# Patient Record
Sex: MALE | Race: WHITE | NOT HISPANIC OR LATINO | Employment: OTHER | URBAN - METROPOLITAN AREA
[De-identification: names, ages, dates, MRNs, and addresses within clinical notes are randomized per-mention and may not be internally consistent; named-entity substitution may affect disease eponyms.]

---

## 2017-01-12 PROBLEM — Z23 NEED FOR IMMUNIZATION AGAINST INFLUENZA: Status: ACTIVE | Noted: 2017-01-12

## 2017-04-13 ENCOUNTER — OFFICE VISIT (OUTPATIENT)
Dept: INTERNAL MEDICINE | Facility: CLINIC | Age: 42
End: 2017-04-13

## 2017-04-13 VITALS
SYSTOLIC BLOOD PRESSURE: 114 MMHG | OXYGEN SATURATION: 99 % | HEART RATE: 68 BPM | WEIGHT: 233.56 LBS | BODY MASS INDEX: 32.7 KG/M2 | HEIGHT: 71 IN | DIASTOLIC BLOOD PRESSURE: 72 MMHG

## 2017-04-13 DIAGNOSIS — F41.1 GENERALIZED ANXIETY DISORDER: Primary | ICD-10-CM

## 2017-04-13 PROCEDURE — 99213 OFFICE O/P EST LOW 20 MIN: CPT | Performed by: NURSE PRACTITIONER

## 2017-04-13 RX ORDER — CLONAZEPAM 0.5 MG/1
0.5 TABLET ORAL 2 TIMES DAILY PRN
Qty: 40 TABLET | Refills: 0 | Status: SHIPPED | OUTPATIENT
Start: 2017-04-13 | End: 2017-05-11 | Stop reason: SDUPTHER

## 2017-04-13 NOTE — PROGRESS NOTES
Subjective   Colby Nicole is a 42 y.o. male. Patient is here today for   Chief Complaint   Patient presents with   • Anxiety     Pt thought that he has some cardiac issues for the last 3 years & see's Dr. Rivera.  Pt states that he has chest pain, SOA, hand tingling every single day. Pt states he has narrowed it down to anxiety.    .    History of Present Illness   Patient is here to discuss anxiety. He has seen Dr. Ortiz and had a cardiology work up which was negative.  He has palpitations and chest pain on a daily basis. He did use some of his brother's klonopin which helped his symptoms, but made him drowsy. He thinks that he needs to have something on a daily basis. 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     The following portions of the patient's history were reviewed and updated as appropriate: allergies, current medications, past family history, past medical history, past social history, past surgical history and problem list.    Review of Systems   Constitutional: Negative.    Respiratory: Positive for shortness of breath. Negative for cough and wheezing.    Cardiovascular: Positive for chest pain.   Psychiatric/Behavioral: The patient is nervous/anxious.        Objective   Vitals:    04/13/17 1007   BP: 114/72   Pulse: 68   SpO2: 99%     Physical Exam   Constitutional: Vital signs are normal. He appears well-developed and well-nourished.   Cardiovascular: Normal rate, regular rhythm and normal heart sounds.    Pulmonary/Chest: Effort normal and breath sounds normal.   Skin: Skin is warm and dry.   Psychiatric: He has a normal mood and affect. His speech is normal and behavior is normal. Thought content normal.   Nursing note and vitals reviewed.      Assessment/Plan   Colby was seen today for anxiety.    Diagnoses and all orders for this visit:    Generalized anxiety disorder  -     sertraline (ZOLOFT) 50 MG tablet; Take 1 tablet by mouth Daily.  -     clonazePAM (KlonoPIN) 0.5 MG  tablet; Take 1 tablet by mouth 2 (Two) Times a Day As Needed for Anxiety.      As part of this patient's treatment plan I am prescribing controlled substances.  The patient has been made aware of appropriate use of such medications, including potential risk of somnolence. Limited ability to drive and/or work safely, and potential for dependence or overdose.  It has also been made clear that these medications are for use by this patient only, without concomitant use of alcohol or other substances unless prescribed.  [facility] report has been reviewed and scanned into the patient's chart.    Patient will be started on zoloft.  He will be given Klonopin to be used as needed.  The goal is for him to not need any Klonopin.  Patient verbalizes understanding. F/u in one month  Patient declines counseling at this time due to time constraints.

## 2017-05-11 ENCOUNTER — OFFICE VISIT (OUTPATIENT)
Dept: INTERNAL MEDICINE | Facility: CLINIC | Age: 42
End: 2017-05-11

## 2017-05-11 VITALS
OXYGEN SATURATION: 98 % | HEIGHT: 71 IN | HEART RATE: 66 BPM | BODY MASS INDEX: 32.35 KG/M2 | WEIGHT: 231.06 LBS | SYSTOLIC BLOOD PRESSURE: 124 MMHG | DIASTOLIC BLOOD PRESSURE: 70 MMHG

## 2017-05-11 DIAGNOSIS — I10 ESSENTIAL HYPERTENSION: Primary | ICD-10-CM

## 2017-05-11 DIAGNOSIS — F41.1 GENERALIZED ANXIETY DISORDER: ICD-10-CM

## 2017-05-11 DIAGNOSIS — F41.9 ANXIETY: ICD-10-CM

## 2017-05-11 PROCEDURE — 99213 OFFICE O/P EST LOW 20 MIN: CPT | Performed by: NURSE PRACTITIONER

## 2017-05-11 RX ORDER — FLUOXETINE HYDROCHLORIDE 20 MG/1
20 CAPSULE ORAL DAILY
Qty: 30 CAPSULE | Refills: 1 | Status: SHIPPED | OUTPATIENT
Start: 2017-05-11 | End: 2017-07-25

## 2017-05-11 RX ORDER — CLONAZEPAM 0.5 MG/1
0.5 TABLET ORAL 2 TIMES DAILY PRN
Qty: 40 TABLET | Refills: 0 | Status: SHIPPED | OUTPATIENT
Start: 2017-05-11 | End: 2017-07-25 | Stop reason: SDUPTHER

## 2017-07-25 ENCOUNTER — OFFICE VISIT (OUTPATIENT)
Dept: INTERNAL MEDICINE | Facility: CLINIC | Age: 42
End: 2017-07-25

## 2017-07-25 VITALS
WEIGHT: 231.25 LBS | HEIGHT: 71 IN | HEART RATE: 67 BPM | SYSTOLIC BLOOD PRESSURE: 120 MMHG | BODY MASS INDEX: 32.37 KG/M2 | OXYGEN SATURATION: 98 % | DIASTOLIC BLOOD PRESSURE: 80 MMHG

## 2017-07-25 DIAGNOSIS — F41.1 GENERALIZED ANXIETY DISORDER: ICD-10-CM

## 2017-07-25 DIAGNOSIS — F41.9 ANXIETY: Primary | ICD-10-CM

## 2017-07-25 DIAGNOSIS — D22.9 NEVUS: ICD-10-CM

## 2017-07-25 PROCEDURE — 99213 OFFICE O/P EST LOW 20 MIN: CPT | Performed by: NURSE PRACTITIONER

## 2017-07-25 RX ORDER — CLONAZEPAM 0.5 MG/1
0.5 TABLET ORAL 2 TIMES DAILY PRN
Qty: 40 TABLET | Refills: 0 | Status: SHIPPED | OUTPATIENT
Start: 2017-07-25 | End: 2017-09-06 | Stop reason: SDUPTHER

## 2017-07-25 NOTE — PROGRESS NOTES
Subjective   Colby Nicole is a 42 y.o. male. Patient is here today for   Chief Complaint   Patient presents with   • Anxiety     Pt here to follow up on anxiety. Pt stated that he has been off of the prozac for 3 weeks due to SE's.    .    History of Present Illness   Patient is here to follow-up on anxiety.  Patient states that Prozac did not help with his anxiety. He felt numb. He also had bad dreams. He didn't like the way it made him feel.  He has been working out a couple days a week and states that he does feel better after working out. He had tried zoloft which also did not help.  He states that he does not need to take the Klonopin on a daily basis, but that it does help when he needs it.  Patient has a few moles that he would like to have looked at.  With his insurance, he needs to have a referral to a dermatologist.  He can't make the appointment without a referral.    The following portions of the patient's history were reviewed and updated as appropriate: allergies, current medications, past family history, past medical history, past social history, past surgical history and problem list.    Review of Systems   Constitutional: Negative.    Respiratory: Negative.    Cardiovascular: Negative.  Negative for palpitations.   Psychiatric/Behavioral: The patient is nervous/anxious.        Objective   Vitals:    07/25/17 1315   BP: 120/80   Pulse: 67   SpO2: 98%     Physical Exam   Constitutional: Vital signs are normal. He appears well-developed and well-nourished.   Cardiovascular: Normal rate, regular rhythm and normal heart sounds.    Pulmonary/Chest: Effort normal and breath sounds normal.   Skin: Skin is warm, dry and intact.   Psychiatric: He has a normal mood and affect. His speech is normal and behavior is normal. Thought content normal.   Nursing note and vitals reviewed.      Assessment/Plan   Colby was seen today for anxiety.    Diagnoses and all orders for this  visit:    Anxiety    Generalized anxiety disorder  -     clonazePAM (KlonoPIN) 0.5 MG tablet; Take 1 tablet by mouth 2 (Two) Times a Day As Needed for Anxiety.    Nevus  -     Ambulatory Referral to Dermatology      Patient will continue with Klonopin for now.  Follow-up in 3 months.

## 2017-09-06 DIAGNOSIS — F41.1 GENERALIZED ANXIETY DISORDER: ICD-10-CM

## 2017-09-06 NOTE — TELEPHONE ENCOUNTER
Pt called & stated that [facility] pharmacy in [place] had told him that our office faxed back a rf request stating that he was no longer a patient of ours. I had let him know that I had not seen a rf request come through & if there was an issue with us discharging him as a patient, he would be notified by our office first.     Pt is requesting a rf on his clonazepam 0.5mg one tablet by mouth BID PRN anxiety #40/0 rf's.

## 2017-09-07 RX ORDER — CLONAZEPAM 0.5 MG/1
0.5 TABLET ORAL 2 TIMES DAILY PRN
Qty: 40 TABLET | Refills: 0 | OUTPATIENT
Start: 2017-09-07 | End: 2017-10-11 | Stop reason: SDUPTHER

## 2017-10-03 ENCOUNTER — OFFICE VISIT (OUTPATIENT)
Dept: INTERNAL MEDICINE | Facility: CLINIC | Age: 42
End: 2017-10-03

## 2017-10-03 VITALS
TEMPERATURE: 98.2 F | OXYGEN SATURATION: 98 % | HEIGHT: 71 IN | SYSTOLIC BLOOD PRESSURE: 106 MMHG | HEART RATE: 70 BPM | BODY MASS INDEX: 32.37 KG/M2 | WEIGHT: 231.19 LBS | DIASTOLIC BLOOD PRESSURE: 70 MMHG

## 2017-10-03 DIAGNOSIS — J01.00 SUBACUTE MAXILLARY SINUSITIS: Primary | ICD-10-CM

## 2017-10-03 PROCEDURE — 99213 OFFICE O/P EST LOW 20 MIN: CPT | Performed by: NURSE PRACTITIONER

## 2017-10-03 RX ORDER — CEFDINIR 300 MG/1
300 CAPSULE ORAL 2 TIMES DAILY
Qty: 20 CAPSULE | Refills: 0 | Status: SHIPPED | OUTPATIENT
Start: 2017-10-03 | End: 2017-11-03

## 2017-10-03 NOTE — PROGRESS NOTES
Subjective   Colby Nicole is a 42 y.o. male. Patient is here today for   Chief Complaint   Patient presents with   • URI     Pt here to follow up on UC visit on 9/26/2017. Pt was treated for URI & still complains of having productive cough, diarrhea, fatigue & chills/sweats.   .    History of Present Illness   C/o nasal congestion for about 10 days associated with cough, chest congestion, chest aches, chills. He went to urgent care and was given prescriptions of tessalon, prednisone, and stahist.  States that Prednisone helped for a few days. He has had some loose stools, but denies nausea or abdominal pain. He has also tried Mucinex without much relief. States that he has a lot of thick, green drainage. Denies sick contacts.     The following portions of the patient's history were reviewed and updated as appropriate: allergies, current medications, past family history, past medical history, past social history, past surgical history and problem list.    Review of Systems   Constitutional: Positive for chills and fatigue.   HENT: Positive for congestion, postnasal drip and sinus pressure. Negative for ear pain and sore throat.    Respiratory: Positive for cough and shortness of breath. Negative for wheezing.    Cardiovascular: Negative.    Gastrointestinal: Positive for diarrhea. Negative for abdominal pain, nausea and vomiting.       Objective   Vitals:    10/03/17 1258   BP: 106/70   Pulse: 70   Temp: 98.2 °F (36.8 °C)   SpO2: 98%     Physical Exam   Constitutional: Vital signs are normal. He appears well-developed and well-nourished.   HENT:   Right Ear: Ear canal normal. A middle ear effusion is present.   Left Ear: Ear canal normal. A middle ear effusion is present.   Mouth/Throat: Posterior oropharyngeal erythema present.   Cardiovascular: Normal rate, regular rhythm and normal heart sounds.    Pulmonary/Chest: Effort normal and breath sounds normal.   Lymphadenopathy:     He has cervical adenopathy.    Skin: Skin is warm, dry and intact.   Psychiatric: He has a normal mood and affect. His speech is normal and behavior is normal. Thought content normal.   Nursing note and vitals reviewed.      Assessment/Plan   Colby was seen today for uri.    Diagnoses and all orders for this visit:    Subacute maxillary sinusitis  -     cefdinir (OMNICEF) 300 MG capsule; Take 1 capsule by mouth 2 (Two) Times a Day.    Continue with tessalon for cough.

## 2017-10-11 DIAGNOSIS — F41.1 GENERALIZED ANXIETY DISORDER: ICD-10-CM

## 2017-10-12 RX ORDER — CLONAZEPAM 0.5 MG/1
TABLET ORAL
Qty: 40 TABLET | Refills: 0 | OUTPATIENT
Start: 2017-10-12 | End: 2017-11-09 | Stop reason: SDUPTHER

## 2017-10-16 DIAGNOSIS — I10 ESSENTIAL HYPERTENSION: Primary | ICD-10-CM

## 2017-10-16 DIAGNOSIS — E78.00 ELEVATED CHOLESTEROL: ICD-10-CM

## 2017-11-02 LAB
ALBUMIN SERPL-MCNC: 4.7 G/DL (ref 3.5–5.2)
ALBUMIN/GLOB SERPL: 2 G/DL
ALP SERPL-CCNC: 56 U/L (ref 39–117)
ALT SERPL-CCNC: 38 U/L (ref 1–41)
AST SERPL-CCNC: 21 U/L (ref 1–40)
BILIRUB SERPL-MCNC: 0.6 MG/DL (ref 0.1–1.2)
BUN SERPL-MCNC: 14 MG/DL (ref 6–20)
BUN/CREAT SERPL: 10.5 (ref 7–25)
CALCIUM SERPL-MCNC: 9.5 MG/DL (ref 8.6–10.5)
CHLORIDE SERPL-SCNC: 100 MMOL/L (ref 98–107)
CHOLEST SERPL-MCNC: 269 MG/DL (ref 0–200)
CHOLEST/HDLC SERPL: 4.34 {RATIO}
CO2 SERPL-SCNC: 28.7 MMOL/L (ref 22–29)
CREAT SERPL-MCNC: 1.33 MG/DL (ref 0.76–1.27)
GFR SERPLBLD CREATININE-BSD FMLA CKD-EPI: 60 ML/MIN/1.73
GFR SERPLBLD CREATININE-BSD FMLA CKD-EPI: 72 ML/MIN/1.73
GLOBULIN SER CALC-MCNC: 2.4 GM/DL
GLUCOSE SERPL-MCNC: 79 MG/DL (ref 65–99)
HDLC SERPL-MCNC: 62 MG/DL (ref 40–60)
LDLC SERPL CALC-MCNC: 189 MG/DL (ref 0–100)
POTASSIUM SERPL-SCNC: 4.4 MMOL/L (ref 3.5–5.2)
PROT SERPL-MCNC: 7.1 G/DL (ref 6–8.5)
SODIUM SERPL-SCNC: 143 MMOL/L (ref 136–145)
TRIGL SERPL-MCNC: 92 MG/DL (ref 0–150)
VLDLC SERPL CALC-MCNC: 18.4 MG/DL (ref 5–40)

## 2017-11-03 ENCOUNTER — OFFICE VISIT (OUTPATIENT)
Dept: INTERNAL MEDICINE | Facility: CLINIC | Age: 42
End: 2017-11-03

## 2017-11-03 VITALS
SYSTOLIC BLOOD PRESSURE: 144 MMHG | BODY MASS INDEX: 32.82 KG/M2 | HEIGHT: 71 IN | HEART RATE: 89 BPM | WEIGHT: 234.44 LBS | OXYGEN SATURATION: 99 % | DIASTOLIC BLOOD PRESSURE: 82 MMHG

## 2017-11-03 DIAGNOSIS — I10 ESSENTIAL HYPERTENSION: ICD-10-CM

## 2017-11-03 DIAGNOSIS — Z23 NEED FOR INFLUENZA VACCINATION: ICD-10-CM

## 2017-11-03 DIAGNOSIS — E78.5 HYPERLIPIDEMIA, UNSPECIFIED HYPERLIPIDEMIA TYPE: ICD-10-CM

## 2017-11-03 DIAGNOSIS — F41.9 ANXIETY: Primary | ICD-10-CM

## 2017-11-03 PROCEDURE — 99214 OFFICE O/P EST MOD 30 MIN: CPT | Performed by: NURSE PRACTITIONER

## 2017-11-03 PROCEDURE — 90686 IIV4 VACC NO PRSV 0.5 ML IM: CPT | Performed by: NURSE PRACTITIONER

## 2017-11-03 PROCEDURE — 90471 IMMUNIZATION ADMIN: CPT | Performed by: NURSE PRACTITIONER

## 2017-11-03 NOTE — PROGRESS NOTES
Subjective   Colby Nicole is a 42 y.o. male. Patient is here today for   Chief Complaint   Patient presents with   • Anxiety     Pt here to follow up on anxiety.    .    History of Present Illness   Patient here to follow up on anxiety and elevated blood pressure. Blood pressure was 170/110 and he took metoprolol which helped. He also took 2 clonazepam which helped with his chest pain. He is thinking that he wants to start on a daily medication for his anxiety. His close friends and family had seen changes for the better when he was on zoloft even though the patient states that he didn't notice a difference. His girlfriend is here today. 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 He goes to counseling every 2 weeks with his ex-wife, but hasn't gone to any  Individual counseling for himself.     HTN - the last time he saw his cardiologist, he said that the patient can use metoprolol prn.     He is also here to review the lab results he had drawn 2 days ago.    The following portions of the patient's history were reviewed and updated as appropriate: allergies, current medications, past family history, past medical history, past social history, past surgical history and problem list.    Review of Systems   Constitutional: Negative.    Respiratory: Negative.    Cardiovascular: Positive for chest pain.   Psychiatric/Behavioral: Positive for agitation. The patient is nervous/anxious.        Objective   Vitals:    11/03/17 1505   BP: 144/82   Pulse: 89   SpO2: 99%     No visits with results within 2 Week(s) from this visit.  Latest known visit with results is:    Orders Only on 10/16/2017   Component Date Value Ref Range Status   • Glucose 11/01/2017 79  65 - 99 mg/dL Final   • BUN 11/01/2017 14  6 - 20 mg/dL Final   • Creatinine 11/01/2017 1.33* 0.76 - 1.27 mg/dL Final   • eGFR Non African Am 11/01/2017 60* >60 mL/min/1.73 Final   • eGFR African Am  11/01/2017 72  >60 mL/min/1.73 Final   • BUN/Creatinine Ratio 11/01/2017 10.5  7.0 - 25.0 Final   • Sodium 11/01/2017 143  136 - 145 mmol/L Final   • Potassium 11/01/2017 4.4  3.5 - 5.2 mmol/L Final   • Chloride 11/01/2017 100  98 - 107 mmol/L Final   • Total CO2 11/01/2017 28.7  22.0 - 29.0 mmol/L Final   • Calcium 11/01/2017 9.5  8.6 - 10.5 mg/dL Final   • Total Protein 11/01/2017 7.1  6.0 - 8.5 g/dL Final   • Albumin 11/01/2017 4.70  3.50 - 5.20 g/dL Final   • Globulin 11/01/2017 2.4  gm/dL Final   • A/G Ratio 11/01/2017 2.0  g/dL Final   • Total Bilirubin 11/01/2017 0.6  0.1 - 1.2 mg/dL Final   • Alkaline Phosphatase 11/01/2017 56  39 - 117 U/L Final   • AST (SGOT) 11/01/2017 21  1 - 40 U/L Final   • ALT (SGPT) 11/01/2017 38  1 - 41 U/L Final   • Total Cholesterol 11/01/2017 269* 0 - 200 mg/dL Final   • Triglycerides 11/01/2017 92  0 - 150 mg/dL Final   • HDL Cholesterol 11/01/2017 62* 40 - 60 mg/dL Final   • VLDL Cholesterol 11/01/2017 18.4  5 - 40 mg/dL Final   • LDL Cholesterol  11/01/2017 189* 0 - 100 mg/dL Final   • Chol/HDL Ratio 11/01/2017 4.34   Final     Reviewed labs with patient.     Physical Exam   Constitutional: Vital signs are normal. He appears well-developed and well-nourished.   Neck: Carotid bruit is not present.   Cardiovascular: Normal rate, regular rhythm and normal heart sounds.    Pulses:       Carotid pulses are 2+ on the right side, and 2+ on the left side.       Radial pulses are 2+ on the right side, and 2+ on the left side.   Pulmonary/Chest: Effort normal and breath sounds normal.   Skin: Skin is warm, dry and intact.   Psychiatric: He has a normal mood and affect. His speech is normal and behavior is normal. Judgment and thought content normal.   Nursing note and vitals reviewed.      Assessment/Plan   Colby was seen today for anxiety.    Diagnoses and all orders for this visit:    Anxiety  -     sertraline (ZOLOFT) 50 MG tablet; Take 1 tablet by mouth Daily.    Essential  hypertension    Hyperlipidemia, unspecified hyperlipidemia type  -     Comprehensive Metabolic Panel; Future  -     Lipid Panel With / Chol / HDL Ratio; Future    Need for influenza vaccination  -     Flu Vaccine Quad PF 3YR+ (FLUARIX/FLUZONE [id number])    Hyperlipidemia - patient given a handout on low cholesterol diet.    HTN - continue with Metoprolol daily, especially during this time of increased stress    Anxiety - will start zoloft again. F/u in one month. Patient does not need a refill on klonopin at this time.

## 2017-11-09 DIAGNOSIS — F41.1 GENERALIZED ANXIETY DISORDER: ICD-10-CM

## 2017-11-10 RX ORDER — CLONAZEPAM 0.5 MG/1
0.5 TABLET ORAL 2 TIMES DAILY PRN
Qty: 40 TABLET | Refills: 0 | OUTPATIENT
Start: 2017-11-10 | End: 2017-12-21 | Stop reason: SDUPTHER

## 2017-12-07 ENCOUNTER — OFFICE VISIT (OUTPATIENT)
Dept: INTERNAL MEDICINE | Facility: CLINIC | Age: 42
End: 2017-12-07

## 2017-12-07 VITALS
TEMPERATURE: 98.6 F | DIASTOLIC BLOOD PRESSURE: 72 MMHG | HEIGHT: 71 IN | WEIGHT: 236.19 LBS | HEART RATE: 76 BPM | BODY MASS INDEX: 33.06 KG/M2 | SYSTOLIC BLOOD PRESSURE: 112 MMHG | OXYGEN SATURATION: 98 %

## 2017-12-07 DIAGNOSIS — F41.9 ANXIETY: Primary | ICD-10-CM

## 2017-12-07 DIAGNOSIS — J06.9 ACUTE URI: ICD-10-CM

## 2017-12-07 PROCEDURE — 99213 OFFICE O/P EST LOW 20 MIN: CPT | Performed by: NURSE PRACTITIONER

## 2017-12-07 RX ORDER — SERTRALINE HYDROCHLORIDE 100 MG/1
100 TABLET, FILM COATED ORAL DAILY
Qty: 30 TABLET | Refills: 3 | Status: SHIPPED | OUTPATIENT
Start: 2017-12-07 | End: 2018-06-22

## 2017-12-07 NOTE — PROGRESS NOTES
Subjective   Colby Nicole is a 42 y.o. male. Patient is here today for   Chief Complaint   Patient presents with   • Anxiety     Pt here to follow up on anxiety Pt was started on Zoloft 50mg once daily. Pt is requesting an increase on this med.    • URI     Pt complains of having a productive cough, nasal & throat drainage x2 day. Pt has been taking Stahist.    .    History of Present Illness   Patient is here to follow up on anxiety. He is taking clonopin up to 3 times a day, but occasionally doesn't need it. He is also taking zoloft 50 mg daily. States that he can tell a difference in his mood since starting this medication.    C/o post-nasal drainage x 3 days associated with cough. His nasal discharge is green colored in the morning. Stahist is starting to help. Denies fever, sick contact.     The following portions of the patient's history were reviewed and updated as appropriate: allergies, current medications, past family history, past medical history, past social history, past surgical history and problem list.    Review of Systems   Constitutional: Negative.    HENT: Positive for congestion and postnasal drip. Negative for sinus pressure.    Respiratory: Positive for cough. Negative for shortness of breath and wheezing.    Cardiovascular: Negative.    Psychiatric/Behavioral: Negative for agitation (improved). The patient is nervous/anxious.        Objective   Vitals:    12/07/17 1106   BP: 112/72   Pulse: 76   Temp: 98.6 °F (37 °C)   SpO2: 98%     Physical Exam   Constitutional: Vital signs are normal. He appears well-developed and well-nourished.   HENT:   Right Ear: Tympanic membrane and ear canal normal.   Left Ear: Tympanic membrane and ear canal normal.   Nose: Right sinus exhibits no maxillary sinus tenderness and no frontal sinus tenderness. Left sinus exhibits no maxillary sinus tenderness and no frontal sinus tenderness.   Mouth/Throat: Mucous membranes are normal. Posterior oropharyngeal  erythema present.   Cardiovascular: Normal rate, regular rhythm and normal heart sounds.    Pulmonary/Chest: Effort normal and breath sounds normal.   Lymphadenopathy:     He has no cervical adenopathy.   Skin: Skin is warm, dry and intact.   Psychiatric: He has a normal mood and affect. His speech is normal and behavior is normal. Thought content normal.   Nursing note and vitals reviewed.      Assessment/Plan   Colby was seen today for anxiety and uri.    Diagnoses and all orders for this visit:    Anxiety  -     sertraline (ZOLOFT) 100 MG tablet; Take 1 tablet by mouth Daily.    Acute URI    Patient is to continue with Stahist. He can call the office next week for possible antibiotic if his symptoms have not improved or have worsened.     Anxiety - will increase zoloft to 100 mg daily. The goal is for him to not need clonazepam on a regular basis. He has an appointment for fasting labs in 2 months. F/u for anxiety at that time.

## 2017-12-14 ENCOUNTER — TELEPHONE (OUTPATIENT)
Dept: INTERNAL MEDICINE | Facility: CLINIC | Age: 42
End: 2017-12-14

## 2017-12-14 RX ORDER — AMOXICILLIN 875 MG/1
875 TABLET, COATED ORAL 2 TIMES DAILY
Qty: 20 TABLET | Refills: 0 | Status: SHIPPED | OUTPATIENT
Start: 2017-12-14 | End: 2017-12-18

## 2017-12-14 NOTE — TELEPHONE ENCOUNTER
----- Message from VINCENT Dominguez sent at 12/14/2017  8:29 AM EST -----  Okay to call in amoxicillin 875 mg po bid x 10 days    ----- Message -----     From: Yamilex Mccallum     Sent: 12/13/2017  11:37 AM       To: VINCENT Dominguez        ----- Message -----     From: Mariel Arambula MA     Sent: 12/13/2017  11:04 AM       To: Yamilex Mccallum    THERES NOTHING IN THE LAST NOTE TO LET US KNOW WHAT WAS GOING ON WITH THE PT.  ----- Message -----     From: Isabel Barrow     Sent: 12/13/2017  10:58 AM       To: Mariel Arambula MA    Pt said he as told to call back if symptoms did not improve and Rupa would call in an antibiotic.  Phone: [phone]  Pharmacy: [facility]

## 2017-12-18 ENCOUNTER — HOSPITAL ENCOUNTER (OUTPATIENT)
Dept: GENERAL RADIOLOGY | Facility: HOSPITAL | Age: 42
Discharge: HOME | End: 2017-12-18
Admitting: NURSE PRACTITIONER

## 2017-12-18 ENCOUNTER — OFFICE VISIT (OUTPATIENT)
Dept: INTERNAL MEDICINE | Facility: CLINIC | Age: 42
End: 2017-12-18

## 2017-12-18 VITALS
OXYGEN SATURATION: 98 % | HEART RATE: 85 BPM | BODY MASS INDEX: 32.52 KG/M2 | HEIGHT: 71 IN | SYSTOLIC BLOOD PRESSURE: 122 MMHG | DIASTOLIC BLOOD PRESSURE: 80 MMHG | WEIGHT: 232.25 LBS

## 2017-12-18 DIAGNOSIS — R10.12 LUQ ABDOMINAL PAIN: ICD-10-CM

## 2017-12-18 DIAGNOSIS — B02.9 HERPES ZOSTER WITHOUT COMPLICATION: Primary | ICD-10-CM

## 2017-12-18 DIAGNOSIS — R05.9 COUGH: ICD-10-CM

## 2017-12-18 PROCEDURE — 71101 X-RAY EXAM UNILAT RIBS/CHEST: CPT

## 2017-12-18 PROCEDURE — 99213 OFFICE O/P EST LOW 20 MIN: CPT | Performed by: NURSE PRACTITIONER

## 2017-12-18 RX ORDER — VALACYCLOVIR HYDROCHLORIDE 1 G/1
1000 TABLET, FILM COATED ORAL 3 TIMES DAILY
Qty: 21 TABLET | Refills: 0 | Status: SHIPPED | OUTPATIENT
Start: 2017-12-18 | End: 2017-12-28

## 2017-12-18 NOTE — PROGRESS NOTES
Subjective   Colby Nicole is a 42 y.o. male. Patient is here today for   Chief Complaint   Patient presents with   • Rib Pain     Pt complains of having left rib pain x2 days. Pt states that this pain radiates to the left side of his back.    .    History of Present Illness   C/o left side back pain x 2 days associated with pain that radiates around to abdomen, nausea. Denies any problems with his bowels. Denies any injury. He has had some nasal congestion, chest congestion, and coughing x 2 weeks. Denies fever.  He has tried Mylanta, gas-X, and a heating pain without relief. Denies blood in urine, history of kidney stones. Denies increased pain with breathing.     The following portions of the patient's history were reviewed and updated as appropriate: allergies, current medications, past family history, past medical history, past social history, past surgical history and problem list.    Review of Systems   Constitutional: Negative.    HENT: Positive for congestion and postnasal drip.    Respiratory: Positive for cough. Negative for shortness of breath and wheezing.    Cardiovascular: Negative.    Gastrointestinal: Positive for abdominal pain. Negative for constipation, diarrhea and nausea.   Musculoskeletal: Positive for back pain.       Objective   Vitals:    12/18/17 1428   BP: 122/80   Pulse: 85   SpO2: 98%     Physical Exam   Constitutional: Vital signs are normal. He appears well-developed and well-nourished.   Cardiovascular: Normal rate, regular rhythm and normal heart sounds.    Pulmonary/Chest: Effort normal and breath sounds normal.   Abdominal: Soft. Normal appearance and bowel sounds are normal. There is tenderness in the left upper quadrant.   Skin: Skin is warm, dry and intact. Rash noted. Rash is vesicular.        Psychiatric: He has a normal mood and affect. His speech is normal and behavior is normal. Thought content normal.   Nursing note and vitals reviewed.      Assessment/Plan   Colby  was seen today for rib pain.    Diagnoses and all orders for this visit:    Herpes zoster without complication  -     valACYclovir (VALTREX) 1000 MG tablet; Take 1 tablet by mouth 3 (Three) Times a Day.    Cough  -     XR Ribs Left With PA Chest; Future    LUQ abdominal pain  -     Amylase  -     Lipase  -     CBC & Differential  -     traMADol (ULTRAM) 50 MG tablet; Take 1 tablet by mouth Every 8 (Eight) Hours As Needed for Moderate Pain .    Patient may have early signs of shingles. Denies any history of having chicken pox. F/u based on labs and x-ray. He could also have some pleurisy from coughing.

## 2017-12-19 LAB
AMYLASE SERPL-CCNC: 98 U/L (ref 28–100)
BASOPHILS # BLD AUTO: 0.03 10*3/MM3 (ref 0–0.2)
BASOPHILS NFR BLD AUTO: 0.4 % (ref 0–1.5)
EOSINOPHIL # BLD AUTO: 0.1 10*3/MM3 (ref 0–0.7)
EOSINOPHIL NFR BLD AUTO: 1.2 % (ref 0.3–6.2)
ERYTHROCYTE [DISTWIDTH] IN BLOOD BY AUTOMATED COUNT: 12.3 % (ref 11.5–14.5)
HCT VFR BLD AUTO: 52.1 % (ref 40.4–52.2)
HGB BLD-MCNC: 17.5 G/DL (ref 13.7–17.6)
IMM GRANULOCYTES # BLD: 0.02 10*3/MM3 (ref 0–0.03)
IMM GRANULOCYTES NFR BLD: 0.2 % (ref 0–0.5)
LIPASE SERPL-CCNC: 38 U/L (ref 13–60)
LYMPHOCYTES # BLD AUTO: 3.17 10*3/MM3 (ref 0.9–4.8)
LYMPHOCYTES NFR BLD AUTO: 37.7 % (ref 19.6–45.3)
MCH RBC QN AUTO: 30.5 PG (ref 27–32.7)
MCHC RBC AUTO-ENTMCNC: 33.6 G/DL (ref 32.6–36.4)
MCV RBC AUTO: 90.8 FL (ref 79.8–96.2)
MONOCYTES # BLD AUTO: 0.69 10*3/MM3 (ref 0.2–1.2)
MONOCYTES NFR BLD AUTO: 8.2 % (ref 5–12)
NEUTROPHILS # BLD AUTO: 4.4 10*3/MM3 (ref 1.9–8.1)
NEUTROPHILS NFR BLD AUTO: 52.3 % (ref 42.7–76)
PLATELET # BLD AUTO: 224 10*3/MM3 (ref 140–500)
RBC # BLD AUTO: 5.74 10*6/MM3 (ref 4.6–6)
WBC # BLD AUTO: 8.41 10*3/MM3 (ref 4.5–10.7)

## 2017-12-19 RX ORDER — TRAMADOL HYDROCHLORIDE 50 MG/1
50 TABLET ORAL EVERY 8 HOURS PRN
Qty: 30 TABLET | Refills: 0 | Status: SHIPPED | OUTPATIENT
Start: 2017-12-19 | End: 2017-12-28

## 2017-12-20 ENCOUNTER — TELEPHONE (OUTPATIENT)
Dept: INTERNAL MEDICINE | Facility: CLINIC | Age: 42
End: 2017-12-20

## 2017-12-20 NOTE — TELEPHONE ENCOUNTER
----- Message from VINCENT Pardo sent at 12/20/2017 11:17 AM EST -----  Regarding: RE: AVELINO  Contact: [phone]  He needs to go to the ER, shingles does not cause nausea and vomiting. Im concerned something else may be going on.   ----- Message -----     From: Shaun Adair     Sent: 12/20/2017  11:13 AM       To: VINCENT Pardo  Subject: FW: SHINMAXIMUS                                      Please advise.  ----- Message -----     From: Yamilex Mccallum     Sent: 12/20/2017  11:00 AM       To: Shaun Adair  Subject: SHINGLES                                         Patients wife called he was seen this week he is now broke out with shingles tramadol is not working and now he has started vomiting he is in a lot of pain. Wife wants to know if we can call something stronger in and if we can give him something for vomiting. He is not able to keep anything down at all.

## 2017-12-20 NOTE — TELEPHONE ENCOUNTER
Patient's wife advised that patient should go to ER for further evaluation per kathie.    Patient's wife was not satisfied with the response, but verbalizes understanding of Kathie's instructions.

## 2017-12-21 DIAGNOSIS — F41.1 GENERALIZED ANXIETY DISORDER: ICD-10-CM

## 2017-12-21 RX ORDER — CLONAZEPAM 0.5 MG/1
0.5 TABLET ORAL 2 TIMES DAILY PRN
Qty: 40 TABLET | Refills: 0 | OUTPATIENT
Start: 2017-12-21 | End: 2018-01-19 | Stop reason: SDUPTHER

## 2017-12-26 ENCOUNTER — TELEPHONE (OUTPATIENT)
Dept: INTERNAL MEDICINE | Facility: CLINIC | Age: 42
End: 2017-12-26

## 2017-12-26 NOTE — TELEPHONE ENCOUNTER
"----- Message from Isabel Barrow sent at 12/26/2017 11:29 AM EST -----  Pt scheduled an appt this morning with Aarti at 11:00am for f/u to ER and Shingles. Patient \"no showed\". At 11:27, patients wife called and wanted to know if one of our providers would write a refill for the pain medicine that the ER prescribed for him  "

## 2017-12-28 ENCOUNTER — OFFICE VISIT (OUTPATIENT)
Dept: INTERNAL MEDICINE | Facility: CLINIC | Age: 42
End: 2017-12-28

## 2017-12-28 VITALS
WEIGHT: 234 LBS | HEIGHT: 71 IN | HEART RATE: 83 BPM | OXYGEN SATURATION: 98 % | SYSTOLIC BLOOD PRESSURE: 120 MMHG | BODY MASS INDEX: 32.76 KG/M2 | DIASTOLIC BLOOD PRESSURE: 78 MMHG

## 2017-12-28 DIAGNOSIS — B02.29 OTHER POSTHERPETIC NERVOUS SYSTEM INVOLVEMENT: Primary | ICD-10-CM

## 2017-12-28 PROBLEM — B02.9 HERPES ZOSTER: Status: ACTIVE | Noted: 2017-12-28

## 2017-12-28 PROCEDURE — 99213 OFFICE O/P EST LOW 20 MIN: CPT | Performed by: NURSE PRACTITIONER

## 2017-12-28 RX ORDER — HYDROCODONE BITARTRATE AND ACETAMINOPHEN 7.5; 325 MG/1; MG/1
1 TABLET ORAL
COMMUNITY
Start: 2017-12-20 | End: 2017-12-28

## 2017-12-28 RX ORDER — LIDOCAINE 50 MG/G
1 PATCH TOPICAL
COMMUNITY
Start: 2017-12-20 | End: 2018-01-05

## 2017-12-28 RX ORDER — PROMETHAZINE HYDROCHLORIDE 25 MG/1
25 TABLET ORAL
COMMUNITY
Start: 2017-12-20 | End: 2018-01-12

## 2017-12-28 NOTE — PROGRESS NOTES
Subjective   Colby Nicole is a 42 y.o. male here for a follow up on shingles and pain.    History of Present Illness   The patient is here today with c/o pain related to recent shingles diagnoses.   Pt originally seen 12/18th with concern for early shingles, given valtrex and tramadol. #30. He was then seen in the ER 12/20th and dx with herpes zoster. He was also having vomiting and difficulty urinating. UA neg, CMP and CBC WNL. Flu negative. Pt given hydrocodone 7.5 #16.  Pain is worse at night. He actually woke him up out of the sleep.  He did finish the valtrex. He states the pain is getting some better.   The following portions of the patient's history were reviewed and updated as appropriate: allergies, current medications, past family history, past medical history, past social history, past surgical history and problem list.    Review of Systems   Constitutional: Positive for chills. Negative for fever.   Respiratory: Negative.    Cardiovascular: Negative.    Musculoskeletal: Positive for back pain.       Objective   Physical Exam   Constitutional: He appears well-developed and well-nourished.   Neck: Normal range of motion. Neck supple. No thyromegaly present.   Cardiovascular: Normal rate, regular rhythm, normal heart sounds and intact distal pulses.    Pulmonary/Chest: Effort normal and breath sounds normal.   Skin: Skin is warm and dry.   Papular erythematous rash present left flank.    Psychiatric: He has a normal mood and affect. His behavior is normal. Judgment and thought content normal.     Vitals:    12/28/17 0947   BP: 120/78   Pulse: 83   SpO2: 98%       Current Outpatient Prescriptions:   •  clonazePAM (KlonoPIN) 0.5 MG tablet, Take 1 tablet by mouth 2 (Two) Times a Day As Needed for Anxiety or Seizures., Disp: 40 tablet, Rfl: 0  •  fluticasone (FLONASE) 50 MCG/ACT nasal spray, 2 sprays into each nostril Daily., Disp: , Rfl:   •  HYDROcodone-acetaminophen (NORCO) 7.5-325 MG per tablet, Take 1  tablet by mouth., Disp: , Rfl:   •  lidocaine (LIDODERM) 5 %, Place 1 patch on the skin., Disp: , Rfl:   •  Omega-3 Fatty Acids (FISH OIL) 1000 MG capsule capsule, Take 1,000 mg by mouth Daily With Breakfast., Disp: , Rfl:   •  omeprazole (priLOSEC) 20 MG capsule, Take 20 mg by mouth Daily., Disp: , Rfl:   •  promethazine (PHENERGAN) 25 MG tablet, Take 25 mg by mouth., Disp: , Rfl:   •  sertraline (ZOLOFT) 100 MG tablet, Take 1 tablet by mouth Daily., Disp: 30 tablet, Rfl: 3  •  traMADol (ULTRAM) 50 MG tablet, Take 1 tablet by mouth Every 8 (Eight) Hours As Needed for Moderate Pain ., Disp: 30 tablet, Rfl: 0  •  NITROSTAT 0.4 MG SL tablet, Take 1 tablet by mouth As Needed., Disp: , Rfl:   •  valACYclovir (VALTREX) 1000 MG tablet, Take 1 tablet by mouth 3 (Three) Times a Day., Disp: 21 tablet, Rfl: 0    Assessment/Plan   There are no diagnoses linked to this encounter.    1. Herpes Zoster- try horizant with josy.  Did discuss side effects and appropriate use.  Wagner up-to-date and CSA signed today for this.  He was given a coupon card for this and hopefully will be able to get 30 days free.  I did discuss that if he needed to this be could taken twice daily but would need to call us so we could change the prescription.

## 2018-01-05 ENCOUNTER — OFFICE VISIT (OUTPATIENT)
Dept: INTERNAL MEDICINE | Facility: CLINIC | Age: 43
End: 2018-01-05

## 2018-01-05 VITALS
SYSTOLIC BLOOD PRESSURE: 110 MMHG | HEIGHT: 71 IN | BODY MASS INDEX: 33.25 KG/M2 | DIASTOLIC BLOOD PRESSURE: 68 MMHG | WEIGHT: 237.5 LBS | OXYGEN SATURATION: 98 % | HEART RATE: 77 BPM | TEMPERATURE: 98.1 F

## 2018-01-05 DIAGNOSIS — H65.112 ACUTE MUCOID OTITIS MEDIA OF LEFT EAR: Primary | ICD-10-CM

## 2018-01-05 DIAGNOSIS — B02.9 HERPES ZOSTER WITHOUT COMPLICATION: ICD-10-CM

## 2018-01-05 PROCEDURE — 99213 OFFICE O/P EST LOW 20 MIN: CPT | Performed by: NURSE PRACTITIONER

## 2018-01-05 RX ORDER — AMOXICILLIN AND CLAVULANATE POTASSIUM 875; 125 MG/1; MG/1
1 TABLET, FILM COATED ORAL EVERY 12 HOURS SCHEDULED
Qty: 20 TABLET | Refills: 0 | Status: SHIPPED | OUTPATIENT
Start: 2018-01-05 | End: 2018-01-12 | Stop reason: ALTCHOICE

## 2018-01-05 NOTE — PROGRESS NOTES
Subjective   Colby Nicole is a 42 y.o. male. Patient is here today for   Chief Complaint   Patient presents with   • URI     Pt complains of having productive cough & throat drainage x2 days. Pt has been taking OTC meds.    .    History of Present Illness   C/o sore throat x 2 days associated with post-nasal drainage, ear pain, non-productive cough.   Took Rachael [place], Benadryl and Nyquil with minimal relief. He did buy some Stahist today. He has also taken vit C.    Patient is here to follow up on shingles. He is taking ibuprofen 800 mg up to 3 times per day. The pain is slowly getting better. The rash has dried up. States that Horizant made him feel drowsy and lidocaine patches didn't help.     The following portions of the patient's history were reviewed and updated as appropriate: allergies, current medications, past family history, past medical history, past social history, past surgical history and problem list.    Review of Systems   Constitutional: Positive for fatigue.   HENT: Positive for congestion, ear pain, postnasal drip and sore throat.    Respiratory: Positive for cough. Negative for shortness of breath and wheezing.    Cardiovascular: Negative.    Skin: Positive for rash.       Objective   Vitals:    01/05/18 1454   BP: 110/68   Pulse: 77   Temp: 98.1 °F (36.7 °C)   SpO2: 98%     Physical Exam   Constitutional: Vital signs are normal. He appears well-developed and well-nourished.   HENT:   Right Ear: Ear canal normal. A middle ear effusion is present.   Left Ear: Ear canal normal. Tympanic membrane is erythematous. A middle ear effusion is present.   Mouth/Throat: Oropharynx is clear and moist and mucous membranes are normal.   Cardiovascular: Normal rate, regular rhythm and normal heart sounds.    Pulmonary/Chest: Effort normal and breath sounds normal.   Lymphadenopathy:     He has no cervical adenopathy.   Skin: Skin is warm, dry and intact. Rash (healing; dried up) noted.         Psychiatric: He has a normal mood and affect. His speech is normal and behavior is normal. Thought content normal.   Nursing note and vitals reviewed.      Assessment/Plan   Colby was seen today for uri.    Diagnoses and all orders for this visit:    Acute mucoid otitis media of left ear  -     amoxicillin-clavulanate (AUGMENTIN) 875-125 MG per tablet; Take 1 tablet by mouth Every 12 (Twelve) Hours.    Herpes zoster without complication    No further treatment needed for herpes zoster.

## 2018-01-12 ENCOUNTER — OFFICE VISIT (OUTPATIENT)
Dept: INTERNAL MEDICINE | Facility: CLINIC | Age: 43
End: 2018-01-12

## 2018-01-12 VITALS
DIASTOLIC BLOOD PRESSURE: 80 MMHG | SYSTOLIC BLOOD PRESSURE: 122 MMHG | HEIGHT: 71 IN | TEMPERATURE: 99 F | BODY MASS INDEX: 33.25 KG/M2 | WEIGHT: 237.5 LBS | OXYGEN SATURATION: 98 % | HEART RATE: 92 BPM

## 2018-01-12 DIAGNOSIS — R50.9 FEVER, UNSPECIFIED FEVER CAUSE: ICD-10-CM

## 2018-01-12 DIAGNOSIS — J01.10 SUBACUTE FRONTAL SINUSITIS: Primary | ICD-10-CM

## 2018-01-12 LAB
EXPIRATION DATE: NORMAL
FLUAV AG NPH QL: NEGATIVE
FLUBV AG NPH QL: NEGATIVE
INTERNAL CONTROL: NORMAL
Lab: NORMAL

## 2018-01-12 PROCEDURE — 87804 INFLUENZA ASSAY W/OPTIC: CPT | Performed by: NURSE PRACTITIONER

## 2018-01-12 PROCEDURE — 99213 OFFICE O/P EST LOW 20 MIN: CPT | Performed by: NURSE PRACTITIONER

## 2018-01-12 RX ORDER — TRAZODONE HYDROCHLORIDE 150 MG/1
1 TABLET ORAL NIGHTLY PRN
COMMUNITY
Start: 2017-12-20 | End: 2018-06-22

## 2018-01-12 RX ORDER — LEVOFLOXACIN 500 MG/1
500 TABLET, FILM COATED ORAL DAILY
Qty: 10 TABLET | Refills: 0 | Status: SHIPPED | OUTPATIENT
Start: 2018-01-12 | End: 2018-02-27

## 2018-01-12 NOTE — PROGRESS NOTES
Subjective   Colby Nicole is a 42 y.o. male. Patient is here today for   Chief Complaint   Patient presents with   • URI     Pt complains of having a productive cough, nasal congestion, right ear pain & temp of 103 with chills x2 days.      .    History of Present Illness   C/o nasal congestion 2 days ago associated with right ear pain, fever, cough. His fever was as high as 100.3F. He took ibuprofen which helped. He is currently on Augmentin for left ear infection which is feeling better. Denies sick contacts. He has been using Stahist. He has been treated with a couple different antibiotics over the past couple months for sinus and ear issues. Denies allergies.     The following portions of the patient's history were reviewed and updated as appropriate: allergies, current medications, past family history, past medical history, past social history, past surgical history and problem list.    Review of Systems   Constitutional: Positive for fever.   HENT: Positive for congestion, ear pain, postnasal drip, sinus pressure and sneezing. Negative for sore throat.    Respiratory: Positive for cough. Negative for shortness of breath and wheezing.    Cardiovascular: Negative.        Objective   Vitals:    01/12/18 1001   BP: 122/80   Pulse: 92   Temp: 99 °F (37.2 °C)   SpO2: 98%     Physical Exam   Constitutional: Vital signs are normal. He appears well-developed and well-nourished.   HENT:   Right Ear: A middle ear effusion is present.   Left Ear: A middle ear effusion is present.   Nose: Right sinus exhibits frontal sinus tenderness. Left sinus exhibits frontal sinus tenderness.   Mouth/Throat: Oropharynx is clear and moist and mucous membranes are normal.   Cardiovascular: Normal rate, regular rhythm and normal heart sounds.    Pulmonary/Chest: Effort normal and breath sounds normal.   Lymphadenopathy:     He has no cervical adenopathy.   Skin: Skin is warm, dry and intact.   Psychiatric: He has a normal mood and  affect. His speech is normal and behavior is normal. Thought content normal.   Nursing note and vitals reviewed.    Results for orders placed or performed in visit on 01/12/18   POC Influenza A / B   Result Value Ref Range    Rapid Influenza A Ag negative     Rapid Influenza B Ag negative     Internal Control Passed Passed    Lot Number [id number]     Expiration Date 06/01/2019        Assessment/Plan   Colby was seen today for uri.    Diagnoses and all orders for this visit:    Subacute frontal sinusitis  -     levoFLOXacin (LEVAQUIN) 500 MG tablet; Take 1 tablet by mouth Daily.    Fever, unspecified fever cause  -     POC Influenza A / B    If patient's symptoms do not improve, he may need a CT scan of his sinuses and/or a referral to ENT.

## 2018-01-15 ENCOUNTER — TELEPHONE (OUTPATIENT)
Dept: INTERNAL MEDICINE | Facility: CLINIC | Age: 43
End: 2018-01-15

## 2018-01-15 DIAGNOSIS — J01.91 ACUTE RECURRENT SINUSITIS, UNSPECIFIED LOCATION: Primary | ICD-10-CM

## 2018-01-15 NOTE — TELEPHONE ENCOUNTER
----- Message from VINCENT Dominguez sent at 1/15/2018 12:45 PM EST -----  Regarding: RE: mri  Contact: [phone]  Actually, a CT of his sinuses.   I will place the order.  ----- Message -----     From: Kamille Pierre MA     Sent: 1/15/2018  12:04 PM       To: IVNCENT Dominguez  Subject: FW: mri                                          Please see below note & advise.     ----- Message -----     From: Yamilex Mccallum     Sent: 1/15/2018  11:34 AM       To: Kamille Pierre MA  Subject: mri                                              Patient called and said he is not any better Rupa told him to call back and she would order a MRI>

## 2018-01-19 ENCOUNTER — HOSPITAL ENCOUNTER (OUTPATIENT)
Dept: CT IMAGING | Facility: HOSPITAL | Age: 43
Discharge: HOME | End: 2018-01-19
Admitting: NURSE PRACTITIONER

## 2018-01-19 DIAGNOSIS — F41.1 GENERALIZED ANXIETY DISORDER: ICD-10-CM

## 2018-01-19 DIAGNOSIS — J01.91 ACUTE RECURRENT SINUSITIS, UNSPECIFIED LOCATION: ICD-10-CM

## 2018-01-19 PROCEDURE — 70486 CT MAXILLOFACIAL W/O DYE: CPT

## 2018-01-19 RX ORDER — CLONAZEPAM 0.5 MG/1
0.5 TABLET ORAL 2 TIMES DAILY PRN
Qty: 40 TABLET | Refills: 0 | OUTPATIENT
Start: 2018-01-19 | End: 2018-03-07 | Stop reason: SDUPTHER

## 2018-01-23 DIAGNOSIS — J34.2 DEVIATED SEPTUM: Primary | ICD-10-CM

## 2018-02-02 ENCOUNTER — RESULTS ENCOUNTER (OUTPATIENT)
Dept: INTERNAL MEDICINE | Facility: CLINIC | Age: 43
End: 2018-02-02

## 2018-02-02 DIAGNOSIS — E78.5 HYPERLIPIDEMIA, UNSPECIFIED HYPERLIPIDEMIA TYPE: ICD-10-CM

## 2018-02-08 LAB
ALBUMIN SERPL-MCNC: 4.8 G/DL (ref 3.5–5.2)
ALBUMIN/GLOB SERPL: 2.2 G/DL
ALP SERPL-CCNC: 54 U/L (ref 39–117)
ALT SERPL-CCNC: 65 U/L (ref 1–41)
AST SERPL-CCNC: 28 U/L (ref 1–40)
BILIRUB SERPL-MCNC: 0.8 MG/DL (ref 0.1–1.2)
BUN SERPL-MCNC: 13 MG/DL (ref 6–20)
BUN/CREAT SERPL: 11.9 (ref 7–25)
CALCIUM SERPL-MCNC: 9.6 MG/DL (ref 8.6–10.5)
CHLORIDE SERPL-SCNC: 100 MMOL/L (ref 98–107)
CHOLEST SERPL-MCNC: 241 MG/DL (ref 0–200)
CHOLEST/HDLC SERPL: 4.38 {RATIO}
CO2 SERPL-SCNC: 27.9 MMOL/L (ref 22–29)
CREAT SERPL-MCNC: 1.09 MG/DL (ref 0.76–1.27)
GFR SERPLBLD CREATININE-BSD FMLA CKD-EPI: 75 ML/MIN/1.73
GFR SERPLBLD CREATININE-BSD FMLA CKD-EPI: 91 ML/MIN/1.73
GLOBULIN SER CALC-MCNC: 2.2 GM/DL
GLUCOSE SERPL-MCNC: 78 MG/DL (ref 65–99)
HDLC SERPL-MCNC: 55 MG/DL (ref 40–60)
LDLC SERPL CALC-MCNC: 164 MG/DL (ref 0–100)
POTASSIUM SERPL-SCNC: 4.2 MMOL/L (ref 3.5–5.2)
PROT SERPL-MCNC: 7 G/DL (ref 6–8.5)
SODIUM SERPL-SCNC: 141 MMOL/L (ref 136–145)
TRIGL SERPL-MCNC: 108 MG/DL (ref 0–150)
VLDLC SERPL CALC-MCNC: 21.6 MG/DL (ref 5–40)

## 2018-02-27 ENCOUNTER — OFFICE VISIT (OUTPATIENT)
Dept: INTERNAL MEDICINE | Facility: CLINIC | Age: 43
End: 2018-02-27

## 2018-02-27 VITALS
BODY MASS INDEX: 33.11 KG/M2 | OXYGEN SATURATION: 98 % | DIASTOLIC BLOOD PRESSURE: 82 MMHG | HEART RATE: 70 BPM | HEIGHT: 71 IN | SYSTOLIC BLOOD PRESSURE: 128 MMHG | WEIGHT: 236.5 LBS

## 2018-02-27 DIAGNOSIS — E78.5 HYPERLIPIDEMIA, UNSPECIFIED HYPERLIPIDEMIA TYPE: Primary | ICD-10-CM

## 2018-02-27 DIAGNOSIS — B02.9 HERPES ZOSTER WITHOUT COMPLICATION: ICD-10-CM

## 2018-02-27 PROCEDURE — 99213 OFFICE O/P EST LOW 20 MIN: CPT | Performed by: NURSE PRACTITIONER

## 2018-03-07 DIAGNOSIS — F41.1 GENERALIZED ANXIETY DISORDER: ICD-10-CM

## 2018-03-08 RX ORDER — CLONAZEPAM 0.5 MG/1
TABLET ORAL
Qty: 40 TABLET | Refills: 0 | Status: SHIPPED | OUTPATIENT
Start: 2018-03-08 | End: 2018-04-09 | Stop reason: SDUPTHER

## 2018-04-09 DIAGNOSIS — F41.1 GENERALIZED ANXIETY DISORDER: ICD-10-CM

## 2018-04-09 RX ORDER — CLONAZEPAM 0.5 MG/1
TABLET ORAL
Qty: 40 TABLET | Refills: 0 | Status: SHIPPED | OUTPATIENT
Start: 2018-04-09 | End: 2018-05-11 | Stop reason: SDUPTHER

## 2018-05-11 DIAGNOSIS — F41.1 GENERALIZED ANXIETY DISORDER: ICD-10-CM

## 2018-05-11 RX ORDER — CLONAZEPAM 0.5 MG/1
TABLET ORAL
Qty: 40 TABLET | Refills: 0 | Status: SHIPPED | OUTPATIENT
Start: 2018-05-11 | End: 2018-06-21 | Stop reason: SDUPTHER

## 2018-05-16 ENCOUNTER — RESULTS ENCOUNTER (OUTPATIENT)
Dept: INTERNAL MEDICINE | Facility: CLINIC | Age: 43
End: 2018-05-16

## 2018-05-16 DIAGNOSIS — E78.5 HYPERLIPIDEMIA, UNSPECIFIED HYPERLIPIDEMIA TYPE: ICD-10-CM

## 2018-06-18 DIAGNOSIS — F41.1 GENERALIZED ANXIETY DISORDER: ICD-10-CM

## 2018-06-18 RX ORDER — CLONAZEPAM 0.5 MG/1
TABLET ORAL
Qty: 40 TABLET | Refills: 0 | OUTPATIENT
Start: 2018-06-18

## 2018-06-20 LAB
ALBUMIN SERPL-MCNC: 4.5 G/DL (ref 3.5–5.2)
ALBUMIN/GLOB SERPL: 2 G/DL
ALP SERPL-CCNC: 54 U/L (ref 39–117)
ALT SERPL-CCNC: 27 U/L (ref 1–41)
AST SERPL-CCNC: 18 U/L (ref 1–40)
BILIRUB SERPL-MCNC: 0.6 MG/DL (ref 0.1–1.2)
BUN SERPL-MCNC: 14 MG/DL (ref 6–20)
BUN/CREAT SERPL: 13.2 (ref 7–25)
CALCIUM SERPL-MCNC: 9.6 MG/DL (ref 8.6–10.5)
CHLORIDE SERPL-SCNC: 101 MMOL/L (ref 98–107)
CHOLEST SERPL-MCNC: 233 MG/DL (ref 0–200)
CHOLEST/HDLC SERPL: 4.57 {RATIO}
CO2 SERPL-SCNC: 25.6 MMOL/L (ref 22–29)
CREAT SERPL-MCNC: 1.06 MG/DL (ref 0.76–1.27)
GFR SERPLBLD CREATININE-BSD FMLA CKD-EPI: 77 ML/MIN/1.73
GFR SERPLBLD CREATININE-BSD FMLA CKD-EPI: 93 ML/MIN/1.73
GLOBULIN SER CALC-MCNC: 2.2 GM/DL
GLUCOSE SERPL-MCNC: 84 MG/DL (ref 65–99)
HDLC SERPL-MCNC: 51 MG/DL (ref 40–60)
LDLC SERPL CALC-MCNC: 158 MG/DL (ref 0–100)
POTASSIUM SERPL-SCNC: 4.4 MMOL/L (ref 3.5–5.2)
PROT SERPL-MCNC: 6.7 G/DL (ref 6–8.5)
SODIUM SERPL-SCNC: 142 MMOL/L (ref 136–145)
TRIGL SERPL-MCNC: 118 MG/DL (ref 0–150)
VLDLC SERPL CALC-MCNC: 23.6 MG/DL (ref 5–40)

## 2018-06-21 DIAGNOSIS — F41.1 GENERALIZED ANXIETY DISORDER: ICD-10-CM

## 2018-06-21 RX ORDER — CLONAZEPAM 0.5 MG/1
TABLET ORAL
Qty: 40 TABLET | Refills: 0 | Status: SHIPPED | OUTPATIENT
Start: 2018-06-21 | End: 2018-07-20 | Stop reason: SDUPTHER

## 2018-06-22 ENCOUNTER — OFFICE VISIT (OUTPATIENT)
Dept: INTERNAL MEDICINE | Facility: CLINIC | Age: 43
End: 2018-06-22

## 2018-06-22 VITALS
WEIGHT: 237.31 LBS | DIASTOLIC BLOOD PRESSURE: 68 MMHG | SYSTOLIC BLOOD PRESSURE: 112 MMHG | HEART RATE: 71 BPM | HEIGHT: 71 IN | OXYGEN SATURATION: 98 % | BODY MASS INDEX: 33.22 KG/M2

## 2018-06-22 DIAGNOSIS — F41.9 ANXIETY: Primary | ICD-10-CM

## 2018-06-22 DIAGNOSIS — E78.5 HYPERLIPIDEMIA, UNSPECIFIED HYPERLIPIDEMIA TYPE: ICD-10-CM

## 2018-06-22 PROCEDURE — 99213 OFFICE O/P EST LOW 20 MIN: CPT | Performed by: NURSE PRACTITIONER

## 2018-06-22 RX ORDER — AZELASTINE HYDROCHLORIDE AND FLUTICASONE PROPIONATE 137; 50 UG/1; UG/1
1 SPRAY, METERED NASAL DAILY
COMMUNITY
Start: 2018-05-16 | End: 2019-08-16 | Stop reason: SDUPTHER

## 2018-06-22 RX ORDER — SERTRALINE HYDROCHLORIDE 100 MG/1
100 TABLET, FILM COATED ORAL DAILY
Qty: 30 TABLET | Refills: 3 | Status: SHIPPED | OUTPATIENT
Start: 2018-06-22 | End: 2018-09-14

## 2018-06-22 NOTE — PROGRESS NOTES
Subjective   Colby Nicole is a 43 y.o. male. Patient is here today for   Chief Complaint   Patient presents with   • Anxiety     Pt here to follow up on anxiety. Pt is wanting to discuss restarting zoloft.    .    History of Present Illness   Patient is here to follow up on anxiety. He was on zoloft and then stopped taking it because he thought that it wasn't helping. After stopping it, he did notice that his panic attacks increased, so the zoloft did help with keeping him from having panic attacks. He restarted it about 2 weeks ago and is needing a refill. He also takes clonazepam prn. He usually takes this at night to help him relax enough to sleep and will occasionally take one during the day also.      The following portions of the patient's history were reviewed and updated as appropriate: allergies, current medications, past family history, past medical history, past social history, past surgical history and problem list.    Review of Systems   Constitutional: Negative.    Respiratory: Negative.    Cardiovascular: Negative.    Psychiatric/Behavioral: Positive for sleep disturbance. Negative for dysphoric mood. The patient is nervous/anxious.        Objective   Vitals:    06/22/18 1402   BP: 112/68   Pulse: 71   SpO2: 98%     Physical Exam   Constitutional: Vital signs are normal. He appears well-developed and well-nourished.   Cardiovascular: Normal rate, regular rhythm and normal heart sounds.    Pulmonary/Chest: Effort normal and breath sounds normal.   Skin: Skin is warm, dry and intact.   Psychiatric: He has a normal mood and affect. His speech is normal and behavior is normal. Thought content normal.   Nursing note and vitals reviewed.      Assessment/Plan   Colby was seen today for anxiety.    Diagnoses and all orders for this visit:    Anxiety  -     sertraline (ZOLOFT) 100 MG tablet; Take 1 tablet by mouth Daily.    Hyperlipidemia, unspecified hyperlipidemia type  -     Comprehensive Metabolic  Panel; Future  -     Lipid Panel With / Chol / HDL Ratio; Future    HLD - Patient will continue to exercise and improve his diet. Recheck CMP and lipids in 3 months.     He does not currently need a refill on clonazepam.     As part of this patient's treatment plan I am prescribing controlled substances.  The patient has been made aware of appropriate use of such medications, including potential risk of somnolence. Limited ability to drive and/or work safely, and potential for dependence or overdose.  It has also been made clear that these medications are for use by this patient only, without concomitant use of alcohol or other substances unless prescribed.  [facility] report has been reviewed and scanned into the patient's chart.    F/u 3 months

## 2018-07-20 DIAGNOSIS — F41.1 GENERALIZED ANXIETY DISORDER: ICD-10-CM

## 2018-07-20 RX ORDER — CLONAZEPAM 0.5 MG/1
TABLET ORAL
Qty: 40 TABLET | Refills: 0 | Status: SHIPPED | OUTPATIENT
Start: 2018-07-20 | End: 2018-08-23 | Stop reason: SDUPTHER

## 2018-08-23 DIAGNOSIS — F41.1 GENERALIZED ANXIETY DISORDER: ICD-10-CM

## 2018-08-23 RX ORDER — CLONAZEPAM 0.5 MG/1
TABLET ORAL
Qty: 40 TABLET | Refills: 0 | Status: SHIPPED | OUTPATIENT
Start: 2018-08-23 | End: 2018-09-26 | Stop reason: SDUPTHER

## 2018-09-14 ENCOUNTER — OFFICE VISIT (OUTPATIENT)
Dept: INTERNAL MEDICINE | Facility: CLINIC | Age: 43
End: 2018-09-14

## 2018-09-14 VITALS
DIASTOLIC BLOOD PRESSURE: 80 MMHG | SYSTOLIC BLOOD PRESSURE: 120 MMHG | BODY MASS INDEX: 32.62 KG/M2 | WEIGHT: 233 LBS | OXYGEN SATURATION: 97 % | TEMPERATURE: 99 F | HEART RATE: 74 BPM | HEIGHT: 71 IN

## 2018-09-14 DIAGNOSIS — J01.00 ACUTE NON-RECURRENT MAXILLARY SINUSITIS: ICD-10-CM

## 2018-09-14 DIAGNOSIS — E78.5 HYPERLIPIDEMIA, UNSPECIFIED HYPERLIPIDEMIA TYPE: ICD-10-CM

## 2018-09-14 DIAGNOSIS — F41.9 ANXIETY: Primary | ICD-10-CM

## 2018-09-14 PROCEDURE — 99213 OFFICE O/P EST LOW 20 MIN: CPT | Performed by: NURSE PRACTITIONER

## 2018-09-14 RX ORDER — AMOXICILLIN AND CLAVULANATE POTASSIUM 875; 125 MG/1; MG/1
1 TABLET, FILM COATED ORAL 2 TIMES DAILY
Qty: 20 TABLET | Refills: 0 | Status: SHIPPED | OUTPATIENT
Start: 2018-09-14 | End: 2019-02-25

## 2018-09-21 ENCOUNTER — RESULTS ENCOUNTER (OUTPATIENT)
Dept: INTERNAL MEDICINE | Facility: CLINIC | Age: 43
End: 2018-09-21

## 2018-09-21 DIAGNOSIS — E78.5 HYPERLIPIDEMIA, UNSPECIFIED HYPERLIPIDEMIA TYPE: ICD-10-CM

## 2018-09-26 DIAGNOSIS — F41.1 GENERALIZED ANXIETY DISORDER: ICD-10-CM

## 2018-09-27 DIAGNOSIS — F41.1 GENERALIZED ANXIETY DISORDER: ICD-10-CM

## 2018-09-27 RX ORDER — CLONAZEPAM 0.5 MG/1
TABLET ORAL
Qty: 40 TABLET | Refills: 0 | Status: SHIPPED | OUTPATIENT
Start: 2018-09-27 | End: 2018-10-24 | Stop reason: SDUPTHER

## 2018-09-27 RX ORDER — CLONAZEPAM 0.5 MG/1
TABLET ORAL
Qty: 40 TABLET | Refills: 0 | OUTPATIENT
Start: 2018-09-27

## 2018-10-24 DIAGNOSIS — F41.1 GENERALIZED ANXIETY DISORDER: ICD-10-CM

## 2018-10-24 RX ORDER — CLONAZEPAM 0.5 MG/1
TABLET ORAL
Qty: 40 TABLET | Refills: 0 | Status: SHIPPED | OUTPATIENT
Start: 2018-10-24 | End: 2018-12-03 | Stop reason: SDUPTHER

## 2018-12-03 DIAGNOSIS — F41.1 GENERALIZED ANXIETY DISORDER: ICD-10-CM

## 2018-12-03 RX ORDER — CLONAZEPAM 0.5 MG/1
TABLET ORAL
Qty: 40 TABLET | Refills: 0 | Status: SHIPPED | OUTPATIENT
Start: 2018-12-03 | End: 2019-01-04 | Stop reason: SDUPTHER

## 2019-01-04 DIAGNOSIS — F41.1 GENERALIZED ANXIETY DISORDER: ICD-10-CM

## 2019-01-04 RX ORDER — CLONAZEPAM 0.5 MG/1
TABLET ORAL
Qty: 40 TABLET | Refills: 0 | Status: SHIPPED | OUTPATIENT
Start: 2019-01-04 | End: 2019-02-25 | Stop reason: SDUPTHER

## 2019-01-10 ENCOUNTER — TELEPHONE (OUTPATIENT)
Dept: INTERNAL MEDICINE | Facility: CLINIC | Age: 44
End: 2019-01-10
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
## 2019-02-14 DIAGNOSIS — F41.1 GENERALIZED ANXIETY DISORDER: ICD-10-CM

## 2019-02-15 RX ORDER — CLONAZEPAM 0.5 MG/1
TABLET ORAL
Qty: 40 TABLET | Refills: 0 | OUTPATIENT
Start: 2019-02-15

## 2019-02-18 DIAGNOSIS — F41.1 GENERALIZED ANXIETY DISORDER: ICD-10-CM

## 2019-02-19 RX ORDER — CLONAZEPAM 0.5 MG/1
TABLET ORAL
Qty: 40 TABLET | Refills: 0 | OUTPATIENT
Start: 2019-02-19

## 2019-02-22 DIAGNOSIS — F41.1 GENERALIZED ANXIETY DISORDER: ICD-10-CM

## 2019-02-22 RX ORDER — CLONAZEPAM 0.5 MG/1
TABLET ORAL
Qty: 40 TABLET | Refills: 0 | OUTPATIENT
Start: 2019-02-22

## 2019-02-25 ENCOUNTER — OFFICE VISIT (OUTPATIENT)
Dept: INTERNAL MEDICINE | Facility: CLINIC | Age: 44
End: 2019-02-25

## 2019-02-25 VITALS
DIASTOLIC BLOOD PRESSURE: 76 MMHG | OXYGEN SATURATION: 98 % | SYSTOLIC BLOOD PRESSURE: 122 MMHG | HEART RATE: 64 BPM | BODY MASS INDEX: 33.04 KG/M2 | WEIGHT: 236 LBS | HEIGHT: 71 IN

## 2019-02-25 DIAGNOSIS — F41.1 GENERALIZED ANXIETY DISORDER: ICD-10-CM

## 2019-02-25 DIAGNOSIS — F41.9 ANXIETY: Primary | ICD-10-CM

## 2019-02-25 PROCEDURE — 99213 OFFICE O/P EST LOW 20 MIN: CPT | Performed by: NURSE PRACTITIONER

## 2019-02-25 RX ORDER — CLONAZEPAM 0.5 MG/1
0.5 TABLET ORAL 2 TIMES DAILY PRN
Qty: 40 TABLET | Refills: 0 | Status: SHIPPED | OUTPATIENT
Start: 2019-02-25 | End: 2019-04-12 | Stop reason: SDUPTHER

## 2019-02-25 NOTE — PROGRESS NOTES
Subjective   Colby Nicole is a 43 y.o. male. Patient is here today for   Chief Complaint   Patient presents with   • Anxiety     Pt here to follow up on Anxiety.    .    History of Present Illness   Patient is here to follow up on anxiety. He takes clonazepam 0.5 mg as needed. He hasn't had any in a couple of days.      He was recently diagnosed with skin cancer on his left arm by [facility].    The following portions of the patient's history were reviewed and updated as appropriate: allergies, current medications, past family history, past medical history, past social history, past surgical history and problem list.    Review of Systems   Constitutional: Negative.    Respiratory: Negative.    Cardiovascular: Negative.    Psychiatric/Behavioral: The patient is nervous/anxious.        Objective   Vitals:    02/25/19 1448   BP: 122/76   Pulse: 64   SpO2: 98%     Physical Exam   Constitutional: He is oriented to person, place, and time. Vital signs are normal. He appears well-developed and well-nourished.   Cardiovascular: Normal rate, regular rhythm and normal heart sounds.   Pulmonary/Chest: Effort normal and breath sounds normal.   Neurological: He is alert and oriented to person, place, and time.   Skin: Skin is warm, dry and intact.   Psychiatric: He has a normal mood and affect. His speech is normal and behavior is normal. Thought content normal.   Nursing note and vitals reviewed.      Assessment/Plan   Colby was seen today for anxiety.    Diagnoses and all orders for this visit:    Anxiety    Generalized anxiety disorder  -     clonazePAM (KlonoPIN) 0.5 MG tablet; Take 1 tablet by mouth 2 (Two) Times a Day As Needed for Anxiety. for anxiety      As part of this patient's treatment plan I am prescribing controlled substances.  The patient has been made aware of appropriate use of such medications, including potential risk of somnolence. Limited ability to drive and/or work safely, and  potential for dependence or overdose.  It has also been made clear that these medications are for use by this patient only, without concomitant use of alcohol or other substances unless prescribed.  [facility] report has been reviewed and scanned into the patient's chart.    Performed urine drug screen in office today. It was negative.

## 2019-04-01 RX ORDER — HYDROCORTISONE ACETATE 25 MG/1
25 SUPPOSITORY RECTAL 2 TIMES DAILY PRN
Qty: 30 SUPPOSITORY | Refills: 2 | Status: SHIPPED | OUTPATIENT
Start: 2019-04-01 | End: 2019-12-05 | Stop reason: SDUPTHER

## 2019-04-12 DIAGNOSIS — F41.1 GENERALIZED ANXIETY DISORDER: ICD-10-CM

## 2019-04-12 RX ORDER — CLONAZEPAM 0.5 MG/1
0.5 TABLET ORAL 2 TIMES DAILY PRN
Qty: 40 TABLET | Refills: 0 | Status: SHIPPED | OUTPATIENT
Start: 2019-04-12 | End: 2019-04-12 | Stop reason: SDUPTHER

## 2019-04-12 RX ORDER — CLONAZEPAM 0.5 MG/1
0.5 TABLET ORAL 2 TIMES DAILY PRN
Qty: 40 TABLET | Refills: 0 | Status: SHIPPED | OUTPATIENT
Start: 2019-04-12 | End: 2019-05-22 | Stop reason: SDUPTHER

## 2019-04-12 NOTE — TELEPHONE ENCOUNTER
I sent this patient's refill in electronically. It printed the first time because his address was wrong in the computer (had country as [place]).

## 2019-05-06 ENCOUNTER — OFFICE VISIT (OUTPATIENT)
Dept: INTERNAL MEDICINE | Facility: CLINIC | Age: 44
End: 2019-05-06

## 2019-05-06 VITALS
OXYGEN SATURATION: 98 % | BODY MASS INDEX: 32.62 KG/M2 | HEART RATE: 59 BPM | SYSTOLIC BLOOD PRESSURE: 126 MMHG | DIASTOLIC BLOOD PRESSURE: 78 MMHG | WEIGHT: 233 LBS | HEIGHT: 71 IN

## 2019-05-06 DIAGNOSIS — M67.40 GANGLION CYST: Primary | ICD-10-CM

## 2019-05-06 DIAGNOSIS — G56.02 CARPAL TUNNEL SYNDROME OF LEFT WRIST: ICD-10-CM

## 2019-05-06 PROCEDURE — 99213 OFFICE O/P EST LOW 20 MIN: CPT | Performed by: NURSE PRACTITIONER

## 2019-05-06 RX ORDER — METHYLPREDNISOLONE 4 MG/1
TABLET ORAL
Qty: 21 TABLET | Refills: 0 | Status: SHIPPED | OUTPATIENT
Start: 2019-05-06 | End: 2019-08-16

## 2019-05-06 NOTE — PROGRESS NOTES
Subjective   Colby Nicole is a 44 y.o. male. Patient is here today for   Chief Complaint   Patient presents with   • Hand Problem     Pt here to discuss knot on right wrist & right thumb pain x3 weeks.    .    History of Present Illness   C/o cyst on right wrist for about 3 weeks associated with numbness and tingling in right thumb. Ibuprofen with minimal relief. Denies any injury. States that it was larger and reduced in size with rest.  C/o left hand tingling associated with some discomfort on dorsal side of left wrist.     The following portions of the patient's history were reviewed and updated as appropriate: allergies, current medications, past family history, past medical history, past social history, past surgical history and problem list.    Review of Systems   Constitutional: Negative.    Respiratory: Negative.    Cardiovascular: Negative.    Musculoskeletal:        Right wrist cyst, left wrist pain   Neurological: Positive for numbness.       Objective   Vitals:    05/06/19 1346   BP: 126/78   Pulse: 59   SpO2: 98%     Physical Exam   Constitutional: Vital signs are normal. He appears well-developed and well-nourished.   Cardiovascular: Normal rate, regular rhythm and normal heart sounds.   Pulses:       Radial pulses are 2+ on the right side, and 2+ on the left side.   Pulmonary/Chest: Effort normal and breath sounds normal.   Musculoskeletal:        Left wrist: He exhibits tenderness.        Hands:  Skin: Skin is warm, dry and intact.   Psychiatric: He has a normal mood and affect. His speech is normal and behavior is normal. Thought content normal.   Nursing note and vitals reviewed.      Assessment/Plan   Colby was seen today for hand problem.    Diagnoses and all orders for this visit:    Ganglion cyst  -     Ambulatory Referral to Hand Surgery    Carpal tunnel syndrome of left wrist  -     methylPREDNISolone (MEDROL) 4 MG tablet; follow package directions  -     Ambulatory Referral to Hand  Surgery

## 2019-05-22 DIAGNOSIS — F41.1 GENERALIZED ANXIETY DISORDER: ICD-10-CM

## 2019-05-23 RX ORDER — CLONAZEPAM 0.5 MG/1
TABLET ORAL
Qty: 40 TABLET | Refills: 0 | Status: SHIPPED | OUTPATIENT
Start: 2019-05-23 | End: 2019-07-08 | Stop reason: SDUPTHER

## 2019-07-08 DIAGNOSIS — F41.1 GENERALIZED ANXIETY DISORDER: ICD-10-CM

## 2019-07-08 RX ORDER — CLONAZEPAM 0.5 MG/1
0.5 TABLET ORAL 2 TIMES DAILY PRN
Qty: 40 TABLET | Refills: 0 | Status: SHIPPED | OUTPATIENT
Start: 2019-07-08 | End: 2019-08-16 | Stop reason: SDUPTHER

## 2019-07-08 NOTE — TELEPHONE ENCOUNTER
CSA- 6/22/2018-needs to be updatedKaser- 5/23/2019Last rf- 5/23/2019Next f/u-not scheduled but due around August.

## 2019-08-08 DIAGNOSIS — F41.1 GENERALIZED ANXIETY DISORDER: ICD-10-CM

## 2019-08-09 RX ORDER — CLONAZEPAM 0.5 MG/1
TABLET ORAL
Qty: 40 TABLET | Refills: 0 | OUTPATIENT
Start: 2019-08-09

## 2019-08-14 LAB
ALBUMIN SERPL-MCNC: 4.5 G/DL (ref 3.5–5.2)
ALBUMIN/GLOB SERPL: 2 G/DL
ALP SERPL-CCNC: 56 U/L (ref 39–117)
ALT SERPL-CCNC: 22 U/L (ref 1–41)
AST SERPL-CCNC: 20 U/L (ref 1–40)
BILIRUB SERPL-MCNC: 0.5 MG/DL (ref 0.2–1.2)
BUN SERPL-MCNC: 15 MG/DL (ref 6–20)
BUN/CREAT SERPL: 11.5 (ref 7–25)
CALCIUM SERPL-MCNC: 9.5 MG/DL (ref 8.6–10.5)
CHLORIDE SERPL-SCNC: 100 MMOL/L (ref 98–107)
CHOLEST SERPL-MCNC: 214 MG/DL (ref 0–200)
CHOLEST/HDLC SERPL: 4.46 {RATIO}
CO2 SERPL-SCNC: 25.7 MMOL/L (ref 22–29)
CREAT SERPL-MCNC: 1.3 MG/DL (ref 0.76–1.27)
GLOBULIN SER CALC-MCNC: 2.3 GM/DL
GLUCOSE SERPL-MCNC: 88 MG/DL (ref 65–99)
HDLC SERPL-MCNC: 48 MG/DL (ref 40–60)
LDLC SERPL CALC-MCNC: 141 MG/DL (ref 0–100)
POTASSIUM SERPL-SCNC: 4.7 MMOL/L (ref 3.5–5.2)
PROT SERPL-MCNC: 6.8 G/DL (ref 6–8.5)
SODIUM SERPL-SCNC: 140 MMOL/L (ref 136–145)
TRIGL SERPL-MCNC: 126 MG/DL (ref 0–150)
VLDLC SERPL CALC-MCNC: 25.2 MG/DL

## 2019-08-16 ENCOUNTER — OFFICE VISIT (OUTPATIENT)
Dept: INTERNAL MEDICINE | Facility: CLINIC | Age: 44
End: 2019-08-16

## 2019-08-16 VITALS
WEIGHT: 236.5 LBS | BODY MASS INDEX: 33.11 KG/M2 | DIASTOLIC BLOOD PRESSURE: 66 MMHG | HEIGHT: 71 IN | OXYGEN SATURATION: 98 % | SYSTOLIC BLOOD PRESSURE: 108 MMHG | TEMPERATURE: 98.4 F | HEART RATE: 69 BPM

## 2019-08-16 DIAGNOSIS — E78.5 HYPERLIPIDEMIA, UNSPECIFIED HYPERLIPIDEMIA TYPE: ICD-10-CM

## 2019-08-16 DIAGNOSIS — F41.1 GENERALIZED ANXIETY DISORDER: Primary | ICD-10-CM

## 2019-08-16 DIAGNOSIS — J30.9 ALLERGIC RHINITIS, UNSPECIFIED SEASONALITY, UNSPECIFIED TRIGGER: ICD-10-CM

## 2019-08-16 PROCEDURE — 99214 OFFICE O/P EST MOD 30 MIN: CPT | Performed by: NURSE PRACTITIONER

## 2019-08-16 RX ORDER — CLONAZEPAM 0.5 MG/1
0.5 TABLET ORAL DAILY PRN
Qty: 30 TABLET | Refills: 0 | Status: SHIPPED | OUTPATIENT
Start: 2019-08-16 | End: 2019-09-27 | Stop reason: SDUPTHER

## 2019-08-16 RX ORDER — AZELASTINE HYDROCHLORIDE AND FLUTICASONE PROPIONATE 137; 50 UG/1; UG/1
1 SPRAY, METERED NASAL DAILY
Qty: 23 G | Refills: 6 | Status: SHIPPED | OUTPATIENT
Start: 2019-08-16 | End: 2021-03-26

## 2019-08-16 NOTE — PROGRESS NOTES
Subjective   Colby Nicole is a 44 y.o. male. Patient is here today for   Chief Complaint   Patient presents with   • Anxiety   • Hyperlipidemia   .    History of Present Illness   Patient is here to follow up on anxiety. He is doing better. He has been taking CBD oil which helps and it helps his sleep. He has not needed to take clonazepam as often and would like to decrease the amount that he is taking.     Patient is also requesting a refill on Dymista.  Takes this daily which does help with his allergy symptoms.  He does have itchy watery eyes often and occasional ear pain.  He was tested for allergies and has several, but is not wanting to do allergy shots at this time    The following portions of the patient's history were reviewed and updated as appropriate: allergies, current medications, past family history, past medical history, past social history, past surgical history and problem list.    Review of Systems   Constitutional: Negative.    Respiratory: Negative.    Cardiovascular: Negative.    Psychiatric/Behavioral: Sleep disturbance: improved. Nervous/anxious: improved.        Objective     Vitals:    08/16/19 1037   BP: 108/66   Pulse: 69   Temp: 98.4 °F (36.9 °C)   SpO2: 98%       Results Encounter on 09/21/2018   Component Date Value Ref Range Status   • Glucose 08/14/2019 88  65 - 99 mg/dL Final   • BUN 08/14/2019 15  6 - 20 mg/dL Final   • Creatinine 08/14/2019 1.30* 0.76 - 1.27 mg/dL Final   • eGFR Non  Am 08/14/2019 61  >60 mL/min/1.73 Final   • eGFR African Am 08/14/2019 73  >60 mL/min/1.73 Final   • BUN/Creatinine Ratio 08/14/2019 11.5  7.0 - 25.0 Final   • Sodium 08/14/2019 140  136 - 145 mmol/L Final   • Potassium 08/14/2019 4.7  3.5 - 5.2 mmol/L Final   • Chloride 08/14/2019 100  98 - 107 mmol/L Final   • Total CO2 08/14/2019 25.7  22.0 - 29.0 mmol/L Final   • Calcium 08/14/2019 9.5  8.6 - 10.5 mg/dL Final   • Total Protein 08/14/2019 6.8  6.0 - 8.5 g/dL Final   • Albumin 08/14/2019  4.50  3.50 - 5.20 g/dL Final   • Globulin 08/14/2019 2.3  gm/dL Final   • A/G Ratio 08/14/2019 2.0  g/dL Final   • Total Bilirubin 08/14/2019 0.5  0.2 - 1.2 mg/dL Final   • Alkaline Phosphatase 08/14/2019 56  39 - 117 U/L Final   • AST (SGOT) 08/14/2019 20  1 - 40 U/L Final   • ALT (SGPT) 08/14/2019 22  1 - 41 U/L Final   • Total Cholesterol 08/14/2019 214* 0 - 200 mg/dL Final   • Triglycerides 08/14/2019 126  0 - 150 mg/dL Final   • HDL Cholesterol 08/14/2019 48  40 - 60 mg/dL Final   • VLDL Cholesterol 08/14/2019 25.2  mg/dL Final   • LDL Cholesterol  08/14/2019 141* 0 - 100 mg/dL Final   • Chol/HDL Ratio 08/14/2019 4.46   Final     Reviewed labs with patient.     Physical Exam   Constitutional: He is oriented to person, place, and time. Vital signs are normal. He appears well-developed and well-nourished.   HENT:   Right Ear: Tympanic membrane and ear canal normal.   Left Ear: Tympanic membrane and ear canal normal.   Mouth/Throat: Oropharynx is clear and moist and mucous membranes are normal.   Cardiovascular: Normal rate, regular rhythm and normal heart sounds.   Pulmonary/Chest: Effort normal and breath sounds normal.   Neurological: He is alert and oriented to person, place, and time.   Skin: Skin is warm, dry and intact.   Psychiatric: He has a normal mood and affect. His speech is normal and behavior is normal. Thought content normal.   Nursing note and vitals reviewed.      Assessment/Plan   Colby was seen today for anxiety and hyperlipidemia.    Diagnoses and all orders for this visit:    Generalized anxiety disorder  -     clonazePAM (KlonoPIN) 0.5 MG tablet; Take 1 tablet by mouth Daily As Needed for Anxiety.    Allergic rhinitis, unspecified seasonality, unspecified trigger  -     DYMISTA 137-50 MCG/ACT suspension; 1 spray into the nostril(s) as directed by provider Daily.    Hyperlipidemia, unspecified hyperlipidemia type      Will decrease clonazepam to once daily #30 tablets. F/u in 4 months.      HLD - improved         Current Outpatient Medications:   •  clonazePAM (KlonoPIN) 0.5 MG tablet, Take 1 tablet by mouth Daily As Needed for Anxiety., Disp: 30 tablet, Rfl: 0  •  DYMISTA 137-50 MCG/ACT suspension, 1 spray into the nostril(s) as directed by provider Daily., Disp: 23 g, Rfl: 6  •  hydrocortisone (ANUSOL-HC) 25 MG suppository, Insert 1 suppository into the rectum 2 (Two) Times a Day As Needed for Hemorrhoids (rectal discomfort)., Disp: 30 suppository, Rfl: 2  •  NITROSTAT 0.4 MG SL tablet, Take 1 tablet by mouth As Needed., Disp: , Rfl:

## 2019-09-27 DIAGNOSIS — F41.1 GENERALIZED ANXIETY DISORDER: ICD-10-CM

## 2019-09-27 RX ORDER — CLONAZEPAM 0.5 MG/1
TABLET ORAL
Qty: 30 TABLET | Refills: 0 | Status: SHIPPED | OUTPATIENT
Start: 2019-09-27 | End: 2019-10-22 | Stop reason: SDUPTHER

## 2019-10-22 DIAGNOSIS — F41.1 GENERALIZED ANXIETY DISORDER: ICD-10-CM

## 2019-10-22 RX ORDER — CLONAZEPAM 0.5 MG/1
TABLET ORAL
Qty: 30 TABLET | Refills: 0 | Status: SHIPPED | OUTPATIENT
Start: 2019-10-22 | End: 2019-11-29 | Stop reason: SDUPTHER

## 2019-11-29 DIAGNOSIS — F41.1 GENERALIZED ANXIETY DISORDER: ICD-10-CM

## 2019-12-02 RX ORDER — CLONAZEPAM 0.5 MG/1
TABLET ORAL
Qty: 30 TABLET | Refills: 0 | Status: SHIPPED | OUTPATIENT
Start: 2019-12-02 | End: 2020-01-09

## 2019-12-05 ENCOUNTER — OFFICE VISIT (OUTPATIENT)
Dept: INTERNAL MEDICINE | Facility: CLINIC | Age: 44
End: 2019-12-05

## 2019-12-05 VITALS
DIASTOLIC BLOOD PRESSURE: 80 MMHG | HEART RATE: 70 BPM | SYSTOLIC BLOOD PRESSURE: 110 MMHG | BODY MASS INDEX: 33 KG/M2 | TEMPERATURE: 99 F | WEIGHT: 235.7 LBS | OXYGEN SATURATION: 98 % | HEIGHT: 71 IN

## 2019-12-05 DIAGNOSIS — K64.9 HEMORRHOIDS, UNSPECIFIED HEMORRHOID TYPE: ICD-10-CM

## 2019-12-05 DIAGNOSIS — F41.9 ANXIETY: Primary | ICD-10-CM

## 2019-12-05 PROCEDURE — 99213 OFFICE O/P EST LOW 20 MIN: CPT | Performed by: NURSE PRACTITIONER

## 2019-12-05 RX ORDER — HYDROCORTISONE ACETATE 25 MG/1
25 SUPPOSITORY RECTAL 2 TIMES DAILY PRN
Qty: 30 SUPPOSITORY | Refills: 2 | Status: SHIPPED | OUTPATIENT
Start: 2019-12-05 | End: 2020-03-09

## 2019-12-05 NOTE — PROGRESS NOTES
Subjective   Colby Nicole is a 44 y.o. male. Patient is here today for   Chief Complaint   Patient presents with   • Anxiety   .    History of Present Illness   Patient is here to follow up on anxiety. He takes clonazepam 0.5 mg daily as needed. He doesn't use this everyday. He has been exercising which seems to help.     He is also requesting a refill on hydrocortisone suppositories for his hemorrhoids.  He has seen GI in the past who prescribed these for him.  States that he was told that he may eventually need to have surgery on them.  He has tried over-the-counter suppositories without much relief.    The following portions of the patient's history were reviewed and updated as appropriate: allergies, current medications, past family history, past medical history, past social history, past surgical history and problem list.    Review of Systems   Constitutional: Negative.    Respiratory: Negative.    Cardiovascular: Negative.    Gastrointestinal: Rectal pain: discomfort.   Psychiatric/Behavioral: Negative.        Objective   Vitals:    12/05/19 1442   BP: 110/80   Pulse: 70   Temp: 99 °F (37.2 °C)   SpO2: 98%     Body mass index is 32.89 kg/m².  Physical Exam   Constitutional: Vital signs are normal. He appears well-developed and well-nourished.   Cardiovascular: Normal rate, regular rhythm and normal heart sounds.   Pulmonary/Chest: Effort normal and breath sounds normal.   Skin: Skin is warm, dry and intact.   Psychiatric: He has a normal mood and affect. His speech is normal and behavior is normal. Thought content normal.   Nursing note and vitals reviewed.      Assessment/Plan   Colby was seen today for anxiety.    Diagnoses and all orders for this visit:    Anxiety    Hemorrhoids, unspecified hemorrhoid type  -     hydrocortisone (ANUSOL-HC) 25 MG suppository; Insert 1 suppository into the rectum 2 (Two) Times a Day As Needed for Hemorrhoids (rectal discomfort).    f/u in 4 months for physical and  fasting labs. He would like to have his PSA checked with his next lab check. He has a family history of prostate cancer.

## 2020-01-08 DIAGNOSIS — F41.1 GENERALIZED ANXIETY DISORDER: ICD-10-CM

## 2020-01-09 RX ORDER — CLONAZEPAM 0.5 MG/1
TABLET ORAL
Qty: 30 TABLET | Refills: 0 | Status: SHIPPED | OUTPATIENT
Start: 2020-01-09 | End: 2020-02-13

## 2020-02-12 DIAGNOSIS — F41.1 GENERALIZED ANXIETY DISORDER: ICD-10-CM

## 2020-02-13 RX ORDER — CLONAZEPAM 0.5 MG/1
TABLET ORAL
Qty: 30 TABLET | Refills: 0 | Status: SHIPPED | OUTPATIENT
Start: 2020-02-13 | End: 2020-03-23

## 2020-03-09 ENCOUNTER — OFFICE VISIT (OUTPATIENT)
Dept: INTERNAL MEDICINE | Facility: CLINIC | Age: 45
End: 2020-03-09

## 2020-03-09 VITALS
HEIGHT: 71 IN | TEMPERATURE: 98.8 F | SYSTOLIC BLOOD PRESSURE: 130 MMHG | DIASTOLIC BLOOD PRESSURE: 78 MMHG | HEART RATE: 66 BPM | WEIGHT: 231 LBS | BODY MASS INDEX: 32.34 KG/M2 | OXYGEN SATURATION: 98 %

## 2020-03-09 DIAGNOSIS — H65.111 ACUTE MUCOID OTITIS MEDIA OF RIGHT EAR: Primary | ICD-10-CM

## 2020-03-09 DIAGNOSIS — R50.9 FEVER, UNSPECIFIED FEVER CAUSE: ICD-10-CM

## 2020-03-09 LAB
EXPIRATION DATE: NORMAL
EXPIRATION DATE: NORMAL
FLUAV AG NPH QL: NEGATIVE
FLUBV AG NPH QL: NEGATIVE
INTERNAL CONTROL: NORMAL
INTERNAL CONTROL: NORMAL
Lab: NORMAL
Lab: NORMAL
S PYO AG THROAT QL: NEGATIVE

## 2020-03-09 PROCEDURE — 99213 OFFICE O/P EST LOW 20 MIN: CPT | Performed by: NURSE PRACTITIONER

## 2020-03-09 PROCEDURE — 87880 STREP A ASSAY W/OPTIC: CPT | Performed by: NURSE PRACTITIONER

## 2020-03-09 PROCEDURE — 87804 INFLUENZA ASSAY W/OPTIC: CPT | Performed by: NURSE PRACTITIONER

## 2020-03-09 RX ORDER — AMOXICILLIN AND CLAVULANATE POTASSIUM 875; 125 MG/1; MG/1
1 TABLET, FILM COATED ORAL 2 TIMES DAILY
Qty: 20 TABLET | Refills: 0 | Status: SHIPPED | OUTPATIENT
Start: 2020-03-09 | End: 2020-03-20

## 2020-03-09 RX ORDER — METHYLPREDNISOLONE 4 MG/1
TABLET ORAL
Qty: 21 TABLET | Refills: 0 | Status: SHIPPED | OUTPATIENT
Start: 2020-03-09 | End: 2020-03-20 | Stop reason: SDUPTHER

## 2020-03-09 NOTE — PROGRESS NOTES
Subjective   Colby Nicole is a 44 y.o. male. Patient is here today for   Chief Complaint   Patient presents with   • Sore Throat     Pt complains of having a ST, chest congestion, right ear pain x2-3 days.    .    History of Present Illness   C/o right ear pain for a few days associated with sore throat, chest congestion. He had a low grade fever of 99.4F yesterday.   His son had flu about 3 weeks ago. He took benadryl with no relief.    The following portions of the patient's history were reviewed and updated as appropriate: allergies, current medications, past family history, past medical history, past social history, past surgical history and problem list.    Review of Systems   Constitutional: Positive for fever.   HENT: Positive for ear pain, postnasal drip and sore throat. Negative for sinus pressure and sinus pain.    Respiratory: Negative.         Chest congestion   Cardiovascular: Negative.        Objective   Vitals:    03/09/20 1404   BP: 130/78   Pulse: 66   Temp: 98.8 °F (37.1 °C)   SpO2: 98%     Body mass index is 32.24 kg/m².  Physical Exam   Constitutional: Vital signs are normal. He appears well-developed and well-nourished.   HENT:   Right Ear: Tympanic membrane is erythematous. A middle ear effusion is present.   Left Ear: Tympanic membrane and ear canal normal.   Mouth/Throat: Oropharynx is clear and moist and mucous membranes are normal.   Cardiovascular: Normal rate, regular rhythm and normal heart sounds.   Pulmonary/Chest: Effort normal and breath sounds normal.   Skin: Skin is warm, dry and intact.   Psychiatric: He has a normal mood and affect. His speech is normal and behavior is normal. Thought content normal.   Nursing note and vitals reviewed.    Results for orders placed or performed in visit on 03/09/20   POCT Influenza A/B   Result Value Ref Range    Rapid Influenza A Ag Negative Negative    Rapid Influenza B Ag Negative Negative    Internal Control Passed Passed    Lot Number  [id number]     Expiration Date 05/06/2022    POCT rapid strep A   Result Value Ref Range    Rapid Strep A Screen Negative Negative, VALID, INVALID, Not Performed    Internal Control Passed Passed    Lot Number [id number]     Expiration Date 04/30/2021        Assessment/Plan   Colby was seen today for sore throat.    Diagnoses and all orders for this visit:    Acute mucoid otitis media of right ear  -     amoxicillin-clavulanate (AUGMENTIN) 875-125 MG per tablet; Take 1 tablet by mouth 2 (Two) Times a Day.  -     methylPREDNISolone (MEDROL) 4 MG tablet; follow package directions  -     POCT Influenza A/B  -     POCT rapid strep A    Fever, unspecified fever cause  -     POCT Influenza A/B  -     POCT rapid strep A

## 2020-03-20 DIAGNOSIS — H65.111 ACUTE MUCOID OTITIS MEDIA OF RIGHT EAR: ICD-10-CM

## 2020-03-20 DIAGNOSIS — F41.1 GENERALIZED ANXIETY DISORDER: ICD-10-CM

## 2020-03-20 RX ORDER — METHYLPREDNISOLONE 4 MG/1
TABLET ORAL
Qty: 21 TABLET | Refills: 0 | Status: SHIPPED | OUTPATIENT
Start: 2020-03-20 | End: 2020-07-13

## 2020-03-20 NOTE — TELEPHONE ENCOUNTER
Pt called and stated he is still having the ear pain from when he saw you on 3/9/20. He stated it is a little better but still hurts and a few times out of the day he gets sharp pains. He has completed medications. He stated he doesn't feel like he has a much drainage. He is wanting to know if something else can be called in.     I did inform him that he had called our office by mistake and gave him the correct number to call and he stated it routed him to us. Not sure why this would have happened.

## 2020-03-20 NOTE — TELEPHONE ENCOUNTER
Pt aware that his refill on Clonazepam will be filled when Rupa gets back to the office on Monday.     Pt continues with right ear pain but it is not consistent. Pt requesting a medrol dose pack. This has been okay'd per Sally KAUR. Pt aware that the rx is being sent to [facility] in [place].

## 2020-03-23 RX ORDER — CLONAZEPAM 0.5 MG/1
TABLET ORAL
Qty: 30 TABLET | Refills: 0 | Status: SHIPPED | OUTPATIENT
Start: 2020-03-23 | End: 2020-04-28

## 2020-04-28 DIAGNOSIS — F41.1 GENERALIZED ANXIETY DISORDER: ICD-10-CM

## 2020-04-28 RX ORDER — CLONAZEPAM 0.5 MG/1
TABLET ORAL
Qty: 30 TABLET | Refills: 0 | Status: SHIPPED | OUTPATIENT
Start: 2020-04-28 | End: 2020-05-26

## 2020-05-26 DIAGNOSIS — F41.1 GENERALIZED ANXIETY DISORDER: ICD-10-CM

## 2020-05-26 RX ORDER — CLONAZEPAM 0.5 MG/1
0.5 TABLET ORAL DAILY PRN
Qty: 30 TABLET | Refills: 0 | Status: SHIPPED | OUTPATIENT
Start: 2020-05-26 | End: 2020-07-07

## 2020-07-06 DIAGNOSIS — F41.1 GENERALIZED ANXIETY DISORDER: ICD-10-CM

## 2020-07-06 DIAGNOSIS — Z00.00 HEALTH CARE MAINTENANCE: Primary | ICD-10-CM

## 2020-07-06 DIAGNOSIS — Z80.42 FAMILY HISTORY OF PROSTATE CANCER: ICD-10-CM

## 2020-07-07 RX ORDER — CLONAZEPAM 0.5 MG/1
TABLET ORAL
Qty: 30 TABLET | Refills: 0 | Status: SHIPPED | OUTPATIENT
Start: 2020-07-07 | End: 2020-08-18

## 2020-07-09 DIAGNOSIS — Z72.51 HIGH RISK SEXUAL BEHAVIOR, UNSPECIFIED TYPE: Primary | ICD-10-CM

## 2020-07-13 ENCOUNTER — OFFICE VISIT (OUTPATIENT)
Dept: INTERNAL MEDICINE | Facility: CLINIC | Age: 45
End: 2020-07-13

## 2020-07-13 VITALS
HEART RATE: 74 BPM | DIASTOLIC BLOOD PRESSURE: 62 MMHG | WEIGHT: 224 LBS | OXYGEN SATURATION: 98 % | HEIGHT: 71 IN | BODY MASS INDEX: 31.36 KG/M2 | SYSTOLIC BLOOD PRESSURE: 120 MMHG

## 2020-07-13 DIAGNOSIS — F41.9 ANXIETY: ICD-10-CM

## 2020-07-13 DIAGNOSIS — Z00.00 HEALTHCARE MAINTENANCE: Primary | ICD-10-CM

## 2020-07-13 PROCEDURE — 99213 OFFICE O/P EST LOW 20 MIN: CPT | Performed by: NURSE PRACTITIONER

## 2020-07-13 PROCEDURE — 99396 PREV VISIT EST AGE 40-64: CPT | Performed by: NURSE PRACTITIONER

## 2020-07-14 LAB
ALBUMIN SERPL-MCNC: 4.6 G/DL (ref 4–5)
ALBUMIN/GLOB SERPL: 2.1 {RATIO} (ref 1.2–2.2)
ALP SERPL-CCNC: 61 IU/L (ref 39–117)
ALT SERPL-CCNC: 25 IU/L (ref 0–44)
AST SERPL-CCNC: 19 IU/L (ref 0–40)
BILIRUB SERPL-MCNC: 0.7 MG/DL (ref 0–1.2)
BUN SERPL-MCNC: 12 MG/DL (ref 6–24)
BUN/CREAT SERPL: 12 (ref 9–20)
C TRACH RRNA SPEC QL NAA+PROBE: NEGATIVE
CALCIUM SERPL-MCNC: 9.4 MG/DL (ref 8.7–10.2)
CHLORIDE SERPL-SCNC: 99 MMOL/L (ref 96–106)
CHOLEST SERPL-MCNC: 218 MG/DL (ref 100–199)
CHOLEST/HDLC SERPL: 3.8 RATIO (ref 0–5)
CO2 SERPL-SCNC: 26 MMOL/L (ref 20–29)
CREAT SERPL-MCNC: 0.98 MG/DL (ref 0.76–1.27)
GLOBULIN SER CALC-MCNC: 2.2 G/DL (ref 1.5–4.5)
GLUCOSE SERPL-MCNC: 80 MG/DL (ref 65–99)
HDLC SERPL-MCNC: 58 MG/DL
HSV1 IGM TITR SER IF: NORMAL TITER
HSV2 IGM TITR SER IF: NORMAL TITER
LDLC SERPL CALC-MCNC: 143 MG/DL (ref 0–99)
N GONORRHOEA RRNA SPEC QL NAA+PROBE: NEGATIVE
POTASSIUM SERPL-SCNC: 4.6 MMOL/L (ref 3.5–5.2)
PROT SERPL-MCNC: 6.8 G/DL (ref 6–8.5)
PSA SERPL-MCNC: 1.3 NG/ML (ref 0–4)
SODIUM SERPL-SCNC: 140 MMOL/L (ref 134–144)
T VAGINALIS DNA SPEC QL NAA+PROBE: NEGATIVE
TRIGL SERPL-MCNC: 84 MG/DL (ref 0–149)
TSH SERPL DL<=0.005 MIU/L-ACNC: 1.73 UIU/ML (ref 0.45–4.5)
VLDLC SERPL CALC-MCNC: 17 MG/DL (ref 5–40)

## 2020-07-16 DIAGNOSIS — J30.9 ALLERGIC RHINITIS, UNSPECIFIED SEASONALITY, UNSPECIFIED TRIGGER: Primary | ICD-10-CM

## 2020-08-18 DIAGNOSIS — F41.1 GENERALIZED ANXIETY DISORDER: ICD-10-CM

## 2020-08-18 RX ORDER — CLONAZEPAM 0.5 MG/1
TABLET ORAL
Qty: 30 TABLET | Refills: 0 | Status: SHIPPED | OUTPATIENT
Start: 2020-08-18 | End: 2020-09-18

## 2020-09-09 DIAGNOSIS — F41.1 GENERALIZED ANXIETY DISORDER: ICD-10-CM

## 2020-09-10 RX ORDER — CLONAZEPAM 0.5 MG/1
TABLET ORAL
Qty: 30 TABLET | Refills: 0 | OUTPATIENT
Start: 2020-09-10

## 2020-09-12 DIAGNOSIS — F41.1 GENERALIZED ANXIETY DISORDER: ICD-10-CM

## 2020-09-14 RX ORDER — CLONAZEPAM 0.5 MG/1
TABLET ORAL
Qty: 30 TABLET | Refills: 0 | OUTPATIENT
Start: 2020-09-14

## 2020-09-18 DIAGNOSIS — F41.1 GENERALIZED ANXIETY DISORDER: ICD-10-CM

## 2020-09-18 RX ORDER — CLONAZEPAM 0.5 MG/1
0.5 TABLET ORAL DAILY PRN
Qty: 30 TABLET | Refills: 0 | Status: SHIPPED | OUTPATIENT
Start: 2020-09-18 | End: 2020-10-16

## 2020-10-16 DIAGNOSIS — F41.1 GENERALIZED ANXIETY DISORDER: ICD-10-CM

## 2020-10-16 RX ORDER — CLONAZEPAM 0.5 MG/1
TABLET ORAL
Qty: 30 TABLET | Refills: 0 | Status: SHIPPED | OUTPATIENT
Start: 2020-10-16 | End: 2020-11-19

## 2020-11-19 DIAGNOSIS — F41.1 GENERALIZED ANXIETY DISORDER: ICD-10-CM

## 2020-11-19 RX ORDER — CLONAZEPAM 0.5 MG/1
TABLET ORAL
Qty: 30 TABLET | Refills: 0 | Status: SHIPPED | OUTPATIENT
Start: 2020-11-19 | End: 2020-12-22

## 2020-12-21 DIAGNOSIS — F41.1 GENERALIZED ANXIETY DISORDER: ICD-10-CM

## 2020-12-22 RX ORDER — CLONAZEPAM 0.5 MG/1
TABLET ORAL
Qty: 30 TABLET | Refills: 0 | Status: SHIPPED | OUTPATIENT
Start: 2020-12-22 | End: 2021-01-25

## 2021-01-04 DIAGNOSIS — E78.5 HYPERLIPIDEMIA, UNSPECIFIED HYPERLIPIDEMIA TYPE: Primary | ICD-10-CM

## 2021-01-04 DIAGNOSIS — Z00.00 HEALTHCARE MAINTENANCE: ICD-10-CM

## 2021-01-05 ENCOUNTER — TELEPHONE (OUTPATIENT)
Dept: INTERNAL MEDICINE | Facility: CLINIC | Age: 46
End: 2021-01-05

## 2021-01-05 NOTE — TELEPHONE ENCOUNTER
[facility].    ----- Message from VINCENT Dominguez sent at 1/5/2021  1:00 PM EST -----  I am not sure what he is asking about.   Does he have the name of the test?  ----- Message -----  From: Isabel Barrow RegSched Rep  Sent: 1/5/2021  11:42 AM EST  To: VINCENT Dominguez    Patient is wanting an order for a lab that he has discussed with you before. He has to take to a different location. Says it is to test detecting cancers?  Not sure what he was talking about. Please call him at [phone]

## 2021-01-05 NOTE — TELEPHONE ENCOUNTER
Left the detail message on his cell that his PSA was normal last we did and there is no blood test that checks for all type of cancers that we know of.    ----- Message from VINCENT Dominguez sent at 1/5/2021  3:59 PM EST -----  There really isn't that I know of. I think I told him that before.   ----- Message -----  From: Soni Dominguez  Sent: 1/5/2021   3:44 PM EST  To: VINCENT Dominguez    HE IS SAYING HE WANT TO DO THE BLOOD TEST THAT CHECKS FOR PROSTATE CANCER AND LOOKS LIKE WE DID PSA ON LAST July. PLUS HE IS WONDERING IF THERE ANY BLOOD TEST THAT CHECKS FOR ANY ALL TYPE OF CANCER.  ----- Message -----  From: Sally Conde APRN  Sent: 1/5/2021   1:00 PM EST  To: Soni Dominguez    I am not sure what he is asking about.   Does he have the name of the test?  ----- Message -----  From: Isabel Barrow RegSched Rep  Sent: 1/5/2021  11:42 AM EST  To: VINCENT Dominguez    Patient is wanting an order for a lab that he has discussed with you before. He has to take to a different location. Says it is to test detecting cancers?  Not sure what he was talking about. Please call him at [phone]

## 2021-01-06 LAB
ALBUMIN SERPL-MCNC: 4.9 G/DL (ref 3.5–5.2)
ALBUMIN/GLOB SERPL: 2.3 G/DL
ALP SERPL-CCNC: 57 U/L (ref 39–117)
ALT SERPL-CCNC: 17 U/L (ref 1–41)
AST SERPL-CCNC: 14 U/L (ref 1–40)
BASOPHILS # BLD AUTO: 0.02 10*3/MM3 (ref 0–0.2)
BASOPHILS NFR BLD AUTO: 0.4 % (ref 0–1.5)
BILIRUB SERPL-MCNC: 0.7 MG/DL (ref 0–1.2)
BUN SERPL-MCNC: 15 MG/DL (ref 6–20)
BUN/CREAT SERPL: 12.5 (ref 7–25)
CALCIUM SERPL-MCNC: 9.8 MG/DL (ref 8.6–10.5)
CHLORIDE SERPL-SCNC: 101 MMOL/L (ref 98–107)
CHOLEST SERPL-MCNC: 228 MG/DL (ref 0–200)
CHOLEST/HDLC SERPL: 4.3 {RATIO}
CO2 SERPL-SCNC: 23.7 MMOL/L (ref 22–29)
CREAT SERPL-MCNC: 1.2 MG/DL (ref 0.76–1.27)
EOSINOPHIL # BLD AUTO: 0.04 10*3/MM3 (ref 0–0.4)
EOSINOPHIL NFR BLD AUTO: 0.7 % (ref 0.3–6.2)
ERYTHROCYTE [DISTWIDTH] IN BLOOD BY AUTOMATED COUNT: 12 % (ref 12.3–15.4)
GLOBULIN SER CALC-MCNC: 2.1 GM/DL
GLUCOSE SERPL-MCNC: 79 MG/DL (ref 65–99)
HCT VFR BLD AUTO: 49.7 % (ref 37.5–51)
HCV AB S/CO SERPL IA: <0.1 S/CO RATIO (ref 0–0.9)
HDLC SERPL-MCNC: 53 MG/DL (ref 40–60)
HGB BLD-MCNC: 17 G/DL (ref 13–17.7)
IMM GRANULOCYTES # BLD AUTO: 0.01 10*3/MM3 (ref 0–0.05)
IMM GRANULOCYTES NFR BLD AUTO: 0.2 % (ref 0–0.5)
LDLC SERPL CALC-MCNC: 161 MG/DL (ref 0–100)
LYMPHOCYTES # BLD AUTO: 1.58 10*3/MM3 (ref 0.7–3.1)
LYMPHOCYTES NFR BLD AUTO: 29.2 % (ref 19.6–45.3)
MCH RBC QN AUTO: 30.4 PG (ref 26.6–33)
MCHC RBC AUTO-ENTMCNC: 34.2 G/DL (ref 31.5–35.7)
MCV RBC AUTO: 88.8 FL (ref 79–97)
MONOCYTES # BLD AUTO: 0.45 10*3/MM3 (ref 0.1–0.9)
MONOCYTES NFR BLD AUTO: 8.3 % (ref 5–12)
NEUTROPHILS # BLD AUTO: 3.32 10*3/MM3 (ref 1.7–7)
NEUTROPHILS NFR BLD AUTO: 61.2 % (ref 42.7–76)
NRBC BLD AUTO-RTO: 0 /100 WBC (ref 0–0.2)
PLATELET # BLD AUTO: 220 10*3/MM3 (ref 140–450)
POTASSIUM SERPL-SCNC: 4.7 MMOL/L (ref 3.5–5.2)
PROT SERPL-MCNC: 7 G/DL (ref 6–8.5)
RBC # BLD AUTO: 5.6 10*6/MM3 (ref 4.14–5.8)
SODIUM SERPL-SCNC: 140 MMOL/L (ref 136–145)
TRIGL SERPL-MCNC: 80 MG/DL (ref 0–150)
VLDLC SERPL CALC-MCNC: 14 MG/DL (ref 5–40)
WBC # BLD AUTO: 5.42 10*3/MM3 (ref 3.4–10.8)

## 2021-01-11 ENCOUNTER — OFFICE VISIT (OUTPATIENT)
Dept: INTERNAL MEDICINE | Facility: CLINIC | Age: 46
End: 2021-01-11

## 2021-01-11 VITALS
OXYGEN SATURATION: 98 % | WEIGHT: 222 LBS | BODY MASS INDEX: 31.08 KG/M2 | HEART RATE: 70 BPM | HEIGHT: 71 IN | DIASTOLIC BLOOD PRESSURE: 78 MMHG | SYSTOLIC BLOOD PRESSURE: 112 MMHG

## 2021-01-11 DIAGNOSIS — F41.9 ANXIETY: Primary | ICD-10-CM

## 2021-01-11 DIAGNOSIS — R68.82 DECREASED LIBIDO: ICD-10-CM

## 2021-01-11 DIAGNOSIS — H04.123 DRY EYE SYNDROME OF BOTH EYES: ICD-10-CM

## 2021-01-11 DIAGNOSIS — Z79.899 HIGH RISK MEDICATION USE: ICD-10-CM

## 2021-01-11 DIAGNOSIS — E78.5 HYPERLIPIDEMIA, UNSPECIFIED HYPERLIPIDEMIA TYPE: ICD-10-CM

## 2021-01-11 PROCEDURE — 99214 OFFICE O/P EST MOD 30 MIN: CPT | Performed by: NURSE PRACTITIONER

## 2021-01-11 NOTE — PROGRESS NOTES
Subjective   Colby Nicole is a 45 y.o. male. Patient is here today for   Chief Complaint   Patient presents with   • Dry Eye     Pt continues with dry eyes.     • Anxiety     Pt here to follow up on anxiety.    .    History of Present Illness   Patient is here to follow up on anxiety. Doing okay on clonazepam. He takes this daily as needed. He has tried several SSRIs without success    Patient c/o dry eyes. Zaditor eye drops work for just a few minutes.   No itching. He is also having trouble with reading close up.     He has seen ortho for his left hip and is in physical therapy.    Patient is having some decreased libido. He is wanting to have his testosterone checked at his next visit.     The following portions of the patient's history were reviewed and updated as appropriate: allergies, current medications, past family history, past medical history, past social history, past surgical history and problem list.    Review of Systems   Constitutional: Negative.    Eyes: Positive for visual disturbance. Negative for pain, redness and itching.        Eye dryness   Respiratory: Negative.    Cardiovascular: Negative.    Psychiatric/Behavioral: Negative.        Objective     Vitals:    01/11/21 1333   BP: 112/78   Pulse: 70   SpO2: 98%     Body mass index is 30.98 kg/m².    Orders Only on 01/04/2021   Component Date Value Ref Range Status   • WBC 01/05/2021 5.42  3.40 - 10.80 10*3/mm3 Final   • RBC 01/05/2021 5.60  4.14 - 5.80 10*6/mm3 Final   • Hemoglobin 01/05/2021 17.0  13.0 - 17.7 g/dL Final   • Hematocrit 01/05/2021 49.7  37.5 - 51.0 % Final   • MCV 01/05/2021 88.8  79.0 - 97.0 fL Final   • MCH 01/05/2021 30.4  26.6 - 33.0 pg Final   • MCHC 01/05/2021 34.2  31.5 - 35.7 g/dL Final   • RDW 01/05/2021 12.0* 12.3 - 15.4 % Final   • Platelets 01/05/2021 220  140 - 450 10*3/mm3 Final   • Neutrophil Rel % 01/05/2021 61.2  42.7 - 76.0 % Final   • Lymphocyte Rel % 01/05/2021 29.2  19.6 - 45.3 % Final   • Monocyte Rel %  01/05/2021 8.3  5.0 - 12.0 % Final   • Eosinophil Rel % 01/05/2021 0.7  0.3 - 6.2 % Final   • Basophil Rel % 01/05/2021 0.4  0.0 - 1.5 % Final   • Neutrophils Absolute 01/05/2021 3.32  1.70 - 7.00 10*3/mm3 Final   • Lymphocytes Absolute 01/05/2021 1.58  0.70 - 3.10 10*3/mm3 Final   • Monocytes Absolute 01/05/2021 0.45  0.10 - 0.90 10*3/mm3 Final   • Eosinophils Absolute 01/05/2021 0.04  0.00 - 0.40 10*3/mm3 Final   • Basophils Absolute 01/05/2021 0.02  0.00 - 0.20 10*3/mm3 Final   • Immature Granulocyte Rel % 01/05/2021 0.2  0.0 - 0.5 % Final   • Immature Grans Absolute 01/05/2021 0.01  0.00 - 0.05 10*3/mm3 Final   • nRBC 01/05/2021 0.0  0.0 - 0.2 /100 WBC Final   • Glucose 01/05/2021 79  65 - 99 mg/dL Final   • BUN 01/05/2021 15  6 - 20 mg/dL Final   • Creatinine 01/05/2021 1.20  0.76 - 1.27 mg/dL Final   • eGFR Non  Am 01/05/2021 66  >60 mL/min/1.73 Final    Comment: GFR Normal >60  Chronic Kidney Disease <60  Kidney Failure <15     • eGFR  Am 01/05/2021 80  >60 mL/min/1.73 Final   • BUN/Creatinine Ratio 01/05/2021 12.5  7.0 - 25.0 Final   • Sodium 01/05/2021 140  136 - 145 mmol/L Final   • Potassium 01/05/2021 4.7  3.5 - 5.2 mmol/L Final    Comment: Slight hemolysis detected by analyzer. Results may be  affected.     • Chloride 01/05/2021 101  98 - 107 mmol/L Final   • Total CO2 01/05/2021 23.7  22.0 - 29.0 mmol/L Final   • Calcium 01/05/2021 9.8  8.6 - 10.5 mg/dL Final   • Total Protein 01/05/2021 7.0  6.0 - 8.5 g/dL Final   • Albumin 01/05/2021 4.90  3.50 - 5.20 g/dL Final   • Globulin 01/05/2021 2.1  gm/dL Final   • A/G Ratio 01/05/2021 2.3  g/dL Final   • Total Bilirubin 01/05/2021 0.7  0.0 - 1.2 mg/dL Final   • Alkaline Phosphatase 01/05/2021 57  39 - 117 U/L Final   • AST (SGOT) 01/05/2021 14  1 - 40 U/L Final   • ALT (SGPT) 01/05/2021 17  1 - 41 U/L Final   • Total Cholesterol 01/05/2021 228* 0 - 200 mg/dL Final    Comment: Cholesterol Reference Ranges  (U.S. Department of Health and Human  Services ATP III  Classifications)  Desirable          <200 mg/dL  Borderline High    200-239 mg/dL  High Risk          >240 mg/dL  Triglyceride Reference Ranges  (U.S. Department of Health and Human Services ATP III  Classifications)  Normal           <150 mg/dL  Borderline High  150-199 mg/dL  High             200-499 mg/dL  Very High        >500 mg/dL  HDL Reference Ranges  (U.S. Department of Health and Human Services ATP III  Classifcations)  Low     <40 mg/dl (major risk factor for CHD)  High    >60 mg/dl ('negative' risk factor for CHD)  LDL Reference Ranges  (U.S. Department of Health and Human Services ATP III  Classifcations)  Optimal          <100 mg/dL  Near Optimal     100-129 mg/dL  Borderline High  130-159 mg/dL  High             160-189 mg/dL  Very High        >189 mg/dL     • Triglycerides 01/05/2021 80  0 - 150 mg/dL Final   • HDL Cholesterol 01/05/2021 53  40 - 60 mg/dL Final   • VLDL Cholesterol Ayush 01/05/2021 14  5 - 40 mg/dL Final   • LDL Chol Calc ([facility]) 01/05/2021 161* 0 - 100 mg/dL Final   • Chol/HDL Ratio 01/05/2021 4.30   Final   • Hep C Virus Ab 01/05/2021 <0.1  0.0 - 0.9 s/co ratio Final    Comment:                                   Negative:     < 0.8                               Indeterminate: 0.8 - 0.9                                    Positive:     > 0.9   The CDC recommends that a positive HCV antibody result   be followed up with a HCV Nucleic Acid Amplification   test ([id number]).       Reviewed labs with patient.     Physical Exam  Vitals signs and nursing note reviewed.   Constitutional:       Appearance: Normal appearance. He is well-developed.   Eyes:      General:         Right eye: No discharge or hordeolum.         Left eye: No discharge or hordeolum.      Extraocular Movements: Extraocular movements intact.   Cardiovascular:      Rate and Rhythm: Normal rate and regular rhythm.      Heart sounds: Normal heart sounds.   Pulmonary:      Effort: Pulmonary effort is normal.       Breath sounds: Normal breath sounds.   Skin:     General: Skin is warm and dry.   Neurological:      Mental Status: He is alert.   Psychiatric:         Attention and Perception: Attention normal.         Mood and Affect: Mood normal.         Speech: Speech normal.         Behavior: Behavior normal.         Thought Content: Thought content normal.         Assessment/Plan   Diagnoses and all orders for this visit:    1. Anxiety (Primary)    2. Dry eye syndrome of both eyes  -     Ambulatory Referral to Ophthalmology    3. High risk medication use  -     452121 8+Oxycodone+Crt-Unbund - Urine, Clean Catch    4. Hyperlipidemia, unspecified hyperlipidemia type  -     Comprehensive Metabolic Panel; Future  -     Lipid Panel With / Chol / HDL Ratio; Future    5. Decreased libido  -     Testosterone, Free, Total; Future    anxiety - continue current meds. As part of this patient's treatment plan I am prescribing controlled substances.  The patient has been made aware of appropriate use of such medications, including potential risk of somnolence. Limited ability to drive and/or work safely, and potential for dependence or overdose.  It has also been made clear that these medications are for use by this patient only, without concomitant use of alcohol or other substances unless prescribed.  BOBY report has been reviewed and scanned into the patient's chart.    HLD - discussed dietary changes. Recheck in 6 months             Current Outpatient Medications:   •  clonazePAM (KlonoPIN) 0.5 MG tablet, TAKE 1 TABLET BY MOUTH ONCE DAILY AS NEEDED FOR ANXIETY, Disp: 30 tablet, Rfl: 0  •  DYMISTA 137-50 MCG/ACT suspension, 1 spray into the nostril(s) as directed by provider Daily., Disp: 23 g, Rfl: 6  •  NITROSTAT 0.4 MG SL tablet, Take 1 tablet by mouth As Needed., Disp: , Rfl:

## 2021-01-12 LAB
AMPHETAMINES UR QL: NEGATIVE NG/ML
BARBITURATES UR QL SCN: NEGATIVE NG/ML
BENZODIAZ UR QL SCN: NEGATIVE NG/ML
COCAINE UR QL SCN: NEGATIVE NG/ML
CREAT UR-MCNC: 42.5 MG/DL (ref 20–300)
METHADONE UR QL SCN: NEGATIVE NG/ML
OPIATES UR QL SCN: NEGATIVE NG/ML
OXYCODONE+OXYMORPHONE UR QL SCN: NEGATIVE NG/ML
PCP UR QL SCN: NEGATIVE NG/ML
PH UR: 5.4 [PH] (ref 4.5–8.9)
PROPOXYPH UR QL SCN: NEGATIVE NG/ML

## 2021-01-25 DIAGNOSIS — F41.1 GENERALIZED ANXIETY DISORDER: ICD-10-CM

## 2021-01-25 RX ORDER — CLONAZEPAM 0.5 MG/1
TABLET ORAL
Qty: 30 TABLET | Refills: 0 | Status: SHIPPED | OUTPATIENT
Start: 2021-01-25 | End: 2021-03-09

## 2021-03-09 DIAGNOSIS — F41.1 GENERALIZED ANXIETY DISORDER: ICD-10-CM

## 2021-03-09 RX ORDER — CLONAZEPAM 0.5 MG/1
TABLET ORAL
Qty: 30 TABLET | Refills: 0 | Status: SHIPPED | OUTPATIENT
Start: 2021-03-09 | End: 2021-06-03

## 2021-03-26 ENCOUNTER — HOSPITAL ENCOUNTER (OUTPATIENT)
Dept: CT IMAGING | Facility: HOSPITAL | Age: 46
Discharge: HOME | End: 2021-03-26
Admitting: NURSE PRACTITIONER

## 2021-03-26 ENCOUNTER — OFFICE VISIT (OUTPATIENT)
Dept: INTERNAL MEDICINE | Facility: CLINIC | Age: 46
End: 2021-03-26

## 2021-03-26 ENCOUNTER — TELEPHONE (OUTPATIENT)
Dept: INTERNAL MEDICINE | Facility: CLINIC | Age: 46
End: 2021-03-26

## 2021-03-26 VITALS
DIASTOLIC BLOOD PRESSURE: 74 MMHG | TEMPERATURE: 97.7 F | WEIGHT: 223.3 LBS | HEART RATE: 61 BPM | SYSTOLIC BLOOD PRESSURE: 136 MMHG | BODY MASS INDEX: 31.26 KG/M2 | OXYGEN SATURATION: 98 % | HEIGHT: 71 IN

## 2021-03-26 DIAGNOSIS — R31.9 HEMATURIA, UNSPECIFIED TYPE: ICD-10-CM

## 2021-03-26 DIAGNOSIS — R10.32 LEFT LOWER QUADRANT ABDOMINAL PAIN: Primary | ICD-10-CM

## 2021-03-26 DIAGNOSIS — Z79.899 HIGH RISK MEDICATION USE: ICD-10-CM

## 2021-03-26 PROBLEM — R14.1 GAS PAIN: Status: ACTIVE | Noted: 2021-01-14

## 2021-03-26 LAB
ALBUMIN SERPL-MCNC: 4.5 G/DL (ref 3.5–5.2)
ALBUMIN/GLOB SERPL: 1.7 G/DL
ALP SERPL-CCNC: 60 U/L (ref 39–117)
ALT SERPL W P-5'-P-CCNC: 22 U/L (ref 1–41)
ANION GAP SERPL CALCULATED.3IONS-SCNC: 7 MMOL/L (ref 5–15)
AST SERPL-CCNC: 24 U/L (ref 1–40)
BILIRUB BLD-MCNC: NEGATIVE MG/DL
BILIRUB SERPL-MCNC: 0.6 MG/DL (ref 0–1.2)
BUN SERPL-MCNC: 10 MG/DL (ref 6–20)
BUN/CREAT SERPL: 8.6 (ref 7–25)
CALCIUM SPEC-SCNC: 9.6 MG/DL (ref 8.6–10.5)
CHLORIDE SERPL-SCNC: 103 MMOL/L (ref 98–107)
CLARITY, POC: CLEAR
CO2 SERPL-SCNC: 31 MMOL/L (ref 22–29)
COLOR UR: YELLOW
CREAT SERPL-MCNC: 1.16 MG/DL (ref 0.76–1.27)
DEPRECATED RDW RBC AUTO: 41 FL (ref 37–54)
ERYTHROCYTE [DISTWIDTH] IN BLOOD BY AUTOMATED COUNT: 12.5 % (ref 12.3–15.4)
GFR SERPL CREATININE-BSD FRML MDRD: 68 ML/MIN/1.73
GLOBULIN UR ELPH-MCNC: 2.7 GM/DL
GLUCOSE SERPL-MCNC: 89 MG/DL (ref 65–99)
GLUCOSE UR STRIP-MCNC: NEGATIVE MG/DL
HCT VFR BLD AUTO: 49 % (ref 37.5–51)
HGB BLD-MCNC: 16.4 G/DL (ref 13–17.7)
KETONES UR QL: NEGATIVE
LEUKOCYTE EST, POC: NEGATIVE
MCH RBC QN AUTO: 29.7 PG (ref 26.6–33)
MCHC RBC AUTO-ENTMCNC: 33.5 G/DL (ref 31.5–35.7)
MCV RBC AUTO: 88.8 FL (ref 79–97)
NITRITE UR-MCNC: NEGATIVE MG/ML
PH UR: 5.5 [PH] (ref 5–8)
PLATELET # BLD AUTO: 214 10*3/MM3 (ref 140–450)
PMV BLD AUTO: 10.2 FL (ref 6–12)
POTASSIUM SERPL-SCNC: 4.2 MMOL/L (ref 3.5–5.2)
PROT SERPL-MCNC: 7.2 G/DL (ref 6–8.5)
PROT UR STRIP-MCNC: NEGATIVE MG/DL
PSA SERPL-MCNC: 1.41 NG/ML (ref 0–4)
RBC # BLD AUTO: 5.52 10*6/MM3 (ref 4.14–5.8)
RBC # UR STRIP: ABNORMAL /UL
SODIUM SERPL-SCNC: 141 MMOL/L (ref 136–145)
SP GR UR: 1.01 (ref 1–1.03)
UROBILINOGEN UR QL: NORMAL
WBC # BLD AUTO: 5.27 10*3/MM3 (ref 3.4–10.8)

## 2021-03-26 PROCEDURE — 80053 COMPREHEN METABOLIC PANEL: CPT | Performed by: NURSE PRACTITIONER

## 2021-03-26 PROCEDURE — 84153 ASSAY OF PSA TOTAL: CPT | Performed by: NURSE PRACTITIONER

## 2021-03-26 PROCEDURE — 36415 COLL VENOUS BLD VENIPUNCTURE: CPT | Performed by: NURSE PRACTITIONER

## 2021-03-26 PROCEDURE — 74177 CT ABD & PELVIS W/CONTRAST: CPT

## 2021-03-26 PROCEDURE — 85027 COMPLETE CBC AUTOMATED: CPT | Performed by: NURSE PRACTITIONER

## 2021-03-26 PROCEDURE — 25010000002 IOPAMIDOL 61 % SOLUTION: Performed by: NURSE PRACTITIONER

## 2021-03-26 PROCEDURE — 99214 OFFICE O/P EST MOD 30 MIN: CPT | Performed by: NURSE PRACTITIONER

## 2021-03-26 PROCEDURE — 0 DIATRIZOATE MEGLUMINE & SODIUM PER 1 ML: Performed by: NURSE PRACTITIONER

## 2021-03-26 PROCEDURE — 81003 URINALYSIS AUTO W/O SCOPE: CPT | Performed by: NURSE PRACTITIONER

## 2021-03-26 RX ORDER — HYDROCODONE BITARTRATE AND ACETAMINOPHEN 5; 325 MG/1; MG/1
1 TABLET ORAL EVERY 6 HOURS PRN
Qty: 20 TABLET | Refills: 0 | Status: SHIPPED | OUTPATIENT
Start: 2021-03-26 | End: 2021-06-03

## 2021-03-26 RX ORDER — AZELASTINE HYDROCHLORIDE 137 UG/1
SPRAY, METERED NASAL
COMMUNITY
Start: 2021-02-08 | End: 2021-09-24

## 2021-03-26 RX ORDER — OMEPRAZOLE 20 MG/1
CAPSULE, DELAYED RELEASE ORAL
COMMUNITY
Start: 2021-01-27 | End: 2021-11-12

## 2021-03-26 RX ADMIN — DIATRIZOATE MEGLUMINE AND DIATRIZOATE SODIUM 30 ML: 660; 100 LIQUID ORAL; RECTAL at 13:50

## 2021-03-26 RX ADMIN — IOPAMIDOL 85 ML: 612 INJECTION, SOLUTION INTRAVENOUS at 14:53

## 2021-03-26 NOTE — TELEPHONE ENCOUNTER
Caller: Colby Nicole    Relationship: Self    Best call back number: [phone]     What medication are you requesting: ANYTHING TO HELP WITH STOMACH PAIN AND SOMETHING THAT CAN HELP HIM SLEEP.       How long have you been experiencing symptoms: YESTERDAY     Have you had these symptoms before:    [] Yes  [x] No    Have you been treated for these symptoms before:   [] Yes  [x] No    If a prescription is needed, what is your preferred pharmacy and phone number:     [facility] [address] [place] [address] [address] [facility] - [phone]  - [phone]   [phone]       Additional notes:      PATIENT GAVE A URINE SAMPLE AND IS CURIOUS HOW LONG IT TAKES TO GET THE CULTURES BACK ON THIS.   PATIENT STATED HE HAD CT SCAN DONE TODAY AND THE RESULTS CAME BACK NEGATIVE AND IS CURIOUS IF WE CAN NOW CALL IN A MEDICATION TO HELP WITH HIS STOMACH PAIN AND A MEDICATION TO HELP HIM SLEEP.  HE STATES THE PAIN IS KEEPING HIM UP AT NIGHT AND ITS SO UNBEARABLE HE CAN HARDLY WALK.  PLEASE CALL AND ADVISE:

## 2021-03-26 NOTE — PROGRESS NOTES
Subjective   Colby Nicole is a 46 y.o. male. Patient is here today for   Chief Complaint   Patient presents with   • Abdominal Pain     Pt c/o abdominal pain X this morning.,   • Rectal Bleeding     Pt c/o BRB in stool lats night and this morning.   .    History of Present Illness   C/o abdominal pain since yesterday evening associated with blood in stool. Pain is more on the left side of his abdomen. Pain somewhat better with standing.  Took some OTC gas meds with minimal relief.  No nausea, vomiting, diarrhea. Just discomfort.  Denies burning with urination, frequency.  Painful to eat. No one else with symptoms  Took 2 suppositories for hemorrhoids.  Patient has a known history of hemorrhoids.  He had a colonoscopy in 2016 with Dr. Val Joe.  He does have a follow-up appointment with her in about a month.    Patient is on clonazepam for anxiety.  Will check a routine drug screen    The following portions of the patient's history were reviewed and updated as appropriate: allergies, current medications, past family history, past medical history, past social history, past surgical history and problem list.    Review of Systems    Objective   Vitals:    03/26/21 1249   BP: 136/74   Pulse: 61   Temp: 97.7 °F (36.5 °C)   SpO2: 98%     Body mass index is 31.16 kg/m².  Physical Exam  Vitals and nursing note reviewed.   Constitutional:       Appearance: Normal appearance. He is well-developed.   Cardiovascular:      Rate and Rhythm: Normal rate and regular rhythm.      Heart sounds: Normal heart sounds.   Pulmonary:      Effort: Pulmonary effort is normal.      Breath sounds: Normal breath sounds.   Abdominal:      General: Bowel sounds are normal.      Palpations: Abdomen is soft.      Tenderness: There is abdominal tenderness in the left upper quadrant and left lower quadrant.   Skin:     General: Skin is warm and dry.   Neurological:      Mental Status: He is alert.   Psychiatric:         Speech: Speech  normal.         Behavior: Behavior normal.         Thought Content: Thought content normal.       Results for orders placed or performed in visit on 03/26/21   POCT urinalysis dipstick, automated    Specimen: Urine   Result Value Ref Range    Color Yellow Yellow, Straw, Dark Yellow, Mily    Clarity, UA Clear Clear    Specific Gravity  1.010 1.005 - 1.030    pH, Urine 5.5 5.0 - 8.0    Leukocytes Negative Negative    Nitrite, UA Negative Negative    Protein, POC Negative Negative mg/dL    Glucose, UA Negative Negative, 1000 mg/dL (3+) mg/dL    Ketones, UA Negative Negative    Urobilinogen, UA Normal Normal    Bilirubin Negative Negative    Blood, UA Trace (A) Negative       Assessment/Plan   Diagnoses and all orders for this visit:    1. Left lower quadrant abdominal pain (Primary)  -     CBC (No Diff)  -     Comprehensive Metabolic Panel  -     CT Abdomen Pelvis With Contrast    2. Hematuria, unspecified type  -     POCT urinalysis dipstick, automated  -     Urine Culture - Urine, Urine, Clean Catch  -     PSA DIAGNOSTIC    3. High risk medication use  -     [id number] 8+Oxycodone+Crt-Unbund - Urine, Clean Catch    Patient will go downstairs for labs.  Stat CT scan.  Based on results, may send in something for pain.  Patient may need to follow-up with Dr. Joe sooner than a month

## 2021-03-27 LAB
AMPHETAMINES UR QL: NEGATIVE NG/ML
BARBITURATES UR QL SCN: NEGATIVE NG/ML
BENZODIAZ UR QL SCN: NEGATIVE NG/ML
COCAINE UR QL SCN: NEGATIVE NG/ML
CREAT UR-MCNC: 31.6 MG/DL (ref 20–300)
METHADONE UR QL SCN: NEGATIVE NG/ML
OPIATES UR QL SCN: NEGATIVE NG/ML
OXYCODONE+OXYMORPHONE UR QL SCN: NEGATIVE NG/ML
PCP UR QL SCN: NEGATIVE NG/ML
PH UR: 5.5 [PH] (ref 4.5–8.9)
PROPOXYPH UR QL SCN: NEGATIVE NG/ML

## 2021-03-28 LAB
BACTERIA UR CULT: NO GROWTH
BACTERIA UR CULT: NORMAL

## 2021-04-23 DIAGNOSIS — F41.1 GENERALIZED ANXIETY DISORDER: ICD-10-CM

## 2021-04-23 RX ORDER — CLONAZEPAM 0.5 MG/1
TABLET ORAL
Qty: 30 TABLET | Refills: 0 | OUTPATIENT
Start: 2021-04-23

## 2021-06-03 ENCOUNTER — OFFICE VISIT (OUTPATIENT)
Dept: INTERNAL MEDICINE | Facility: CLINIC | Age: 46
End: 2021-06-03

## 2021-06-03 VITALS
WEIGHT: 225 LBS | DIASTOLIC BLOOD PRESSURE: 78 MMHG | HEART RATE: 72 BPM | OXYGEN SATURATION: 98 % | HEIGHT: 71 IN | BODY MASS INDEX: 31.5 KG/M2 | SYSTOLIC BLOOD PRESSURE: 120 MMHG

## 2021-06-03 DIAGNOSIS — F41.9 ANXIETY: Primary | ICD-10-CM

## 2021-06-03 DIAGNOSIS — R00.2 PALPITATIONS: ICD-10-CM

## 2021-06-03 PROCEDURE — 93000 ELECTROCARDIOGRAM COMPLETE: CPT | Performed by: NURSE PRACTITIONER

## 2021-06-03 PROCEDURE — 99213 OFFICE O/P EST LOW 20 MIN: CPT | Performed by: NURSE PRACTITIONER

## 2021-06-03 RX ORDER — BUSPIRONE HYDROCHLORIDE 10 MG/1
10 TABLET ORAL 3 TIMES DAILY PRN
Qty: 60 TABLET | Refills: 1 | Status: SHIPPED | OUTPATIENT
Start: 2021-06-03 | End: 2021-11-12

## 2021-06-03 NOTE — PATIENT INSTRUCTIONS
Take zoloft daily. Start with 1/2 tab daily for 2 weeks, then increase to a full tablet. You can take buspirone up to 3 times a day as needed

## 2021-06-03 NOTE — PROGRESS NOTES
Subjective   Colby Nicole is a 46 y.o. male. Patient is here today for   Chief Complaint   Patient presents with   • Anxiety   .    History of Present Illness   C/o increased anxiety. He had a panic attack. His symptoms were feeling warm all over, dizzy, heart palpitations.  He has been exercising regularly.   He was on clonazepam in the past, but hasn't had any in about a month. He has tried Zoloft and prozac in the past.  States that he did not like how he felt on the Prozac.  He is willing to try Zoloft again    The following portions of the patient's history were reviewed and updated as appropriate: allergies, current medications, past family history, past medical history, past social history, past surgical history and problem list.    Review of Systems    Objective   Vitals:    06/03/21 1429   BP: 120/78   Pulse: 72   SpO2: 98%     Body mass index is 31.4 kg/m².  Physical Exam  Vitals and nursing note reviewed.   Constitutional:       Appearance: Normal appearance. He is well-developed.   Cardiovascular:      Rate and Rhythm: Normal rate and regular rhythm.      Heart sounds: Normal heart sounds.   Pulmonary:      Effort: Pulmonary effort is normal.      Breath sounds: Normal breath sounds.   Skin:     General: Skin is warm and dry.   Neurological:      Mental Status: He is alert and oriented to person, place, and time.   Psychiatric:         Speech: Speech normal.         Behavior: Behavior normal.         Thought Content: Thought content normal.           ECG 12 Lead    Date/Time: 6/3/2021 2:53 PM  Performed by: Sally Conde APRN  Authorized by: Sally Conde APRN   Comparison: compared with previous ECG from 11/30/2016  Similar to previous ECG  Rhythm: sinus rhythm  Rate: normal  BPM: 69  Conduction: conduction normal  ST Segments: ST segments normal  T Waves: T waves normal  QRS axis: normal  Other: no other findings    Clinical impression: normal ECG  Comments: WI interval 166 ms  QRS interval  108 ms  QTc interval 380 ms              Assessment/Plan   Diagnoses and all orders for this visit:    1. Anxiety (Primary)  -     sertraline (Zoloft) 50 MG tablet; Take 1 tablet by mouth Daily.  Dispense: 30 tablet; Refill: 1  -     busPIRone (BUSPAR) 10 MG tablet; Take 1 tablet by mouth 3 (Three) Times a Day As Needed (anxiety).  Dispense: 60 tablet; Refill: 1    2. Palpitations  -     ECG 12 Lead      Patient will be started on sertraline.  Explained that it can take several weeks to get full benefit from this medication.  He can also use buspirone as needed.  He has an appointment scheduled in July for physical and fasting labs

## 2021-08-30 ENCOUNTER — OFFICE VISIT (OUTPATIENT)
Dept: INTERNAL MEDICINE | Facility: CLINIC | Age: 46
End: 2021-08-30

## 2021-08-30 VITALS
HEART RATE: 78 BPM | OXYGEN SATURATION: 98 % | SYSTOLIC BLOOD PRESSURE: 138 MMHG | TEMPERATURE: 97.8 F | HEIGHT: 71 IN | BODY MASS INDEX: 31.56 KG/M2 | WEIGHT: 225.4 LBS | DIASTOLIC BLOOD PRESSURE: 92 MMHG

## 2021-08-30 DIAGNOSIS — R13.10 DYSPHAGIA, UNSPECIFIED TYPE: ICD-10-CM

## 2021-08-30 DIAGNOSIS — Z00.00 HEALTHCARE MAINTENANCE: Primary | ICD-10-CM

## 2021-08-30 DIAGNOSIS — F41.9 ANXIETY: ICD-10-CM

## 2021-08-30 DIAGNOSIS — I10 ESSENTIAL HYPERTENSION: ICD-10-CM

## 2021-08-30 DIAGNOSIS — K21.9 GASTROESOPHAGEAL REFLUX DISEASE, UNSPECIFIED WHETHER ESOPHAGITIS PRESENT: ICD-10-CM

## 2021-08-30 PROCEDURE — 99213 OFFICE O/P EST LOW 20 MIN: CPT | Performed by: NURSE PRACTITIONER

## 2021-08-30 PROCEDURE — 99396 PREV VISIT EST AGE 40-64: CPT | Performed by: NURSE PRACTITIONER

## 2021-08-30 RX ORDER — METOPROLOL SUCCINATE 50 MG/1
50 TABLET, EXTENDED RELEASE ORAL DAILY
Qty: 90 TABLET | Refills: 1 | Status: SHIPPED | OUTPATIENT
Start: 2021-08-30 | End: 2022-12-07

## 2021-08-30 RX ORDER — VORTIOXETINE 10 MG/1
10 TABLET, FILM COATED ORAL DAILY
Qty: 30 TABLET | Refills: 2 | Status: SHIPPED | OUTPATIENT
Start: 2021-08-30 | End: 2021-08-31 | Stop reason: CLARIF

## 2021-08-30 NOTE — PROGRESS NOTES
"Subjective   Colby Nicole is a 46 y.o. male and is here for a comprehensive physical exam. The patient reports : Patient is here to follow up on anxiety.   Patient stopped sertraline. He felt numb. Couldn't sleep. Buspar makes him tired. He feels Anxious daily and has occasional chest pain. Feels like he on edge. Then gets palpitations.   His blood pressure ranges from 130-150s/. He was on metoprolol in the past but it was stopped because his BP was controlled.  States that when he was on clonazepam for his anxiety, his blood pressure was also controlled    Do you take any herbs or supplements that were not prescribed by a doctor? no    C/o occasional reflux which occurs once every 2 weeks associated with decreased appetite. No nausea, vomiting. Some lightheadedness    The following portions of the patient's history were reviewed and updated as appropriate: allergies, current medications, past family history, past medical history, past social history, past surgical history and problem list.    Review of Systems  Do you have pain that bothers you in your daily life? no  Pertinent items are noted in HPI.    Objective   /92   Pulse 78   Temp 97.8 °F (36.6 °C)   Ht 180.3 cm (70.98\")   Wt 102 kg (225 lb 6.4 oz)   SpO2 98%   BMI 31.45 kg/m²     General Appearance:    Alert, cooperative, no distress, appears stated age   Head:    Normocephalic, without obvious abnormality, atraumatic   Eyes:    PERRL, conjunctiva/corneas clear, EOM's intact, both eyes   Ears:    Normal TM's and external ear canals, both ears   Nose:   Nares normal, septum midline, mucosa normal, no drainage    or sinus tenderness   Throat:   Lips, mucosa, and tongue normal; teeth and gums normal   Neck:   Supple, symmetrical, trachea midline, no adenopathy;     thyroid:  no enlargement/tenderness/nodules; no carotid    bruit   Back:     Symmetric, no curvature, ROM normal, no CVA tenderness   Lungs:     Clear to auscultation " bilaterally, respirations unlabored   Chest Wall:    No tenderness or deformity    Heart:    Regular rate and rhythm, S1 and S2 normal, no murmur       Abdomen:     Soft, non-tender, bowel sounds active all four quadrants,     no masses, no organomegaly           Extremities:   Extremities normal, atraumatic, no cyanosis or edema   Pulses:   2+ and symmetric all extremities   Skin:   Skin color, texture, turgor normal, no rashes or lesions   Lymph nodes:   Cervical, supraclavicular, and axillary nodes normal   Neurologic:   Grossly intact, normal strength, sensation and reflexes     throughout        No visits with results within 2 Week(s) from this visit.   Latest known visit with results is:   Results Encounter on 07/12/2021   Component Date Value Ref Range Status   • Glucose 08/25/2021 79  65 - 99 mg/dL Final   • BUN 08/25/2021 17  6 - 20 mg/dL Final   • Creatinine 08/25/2021 1.32* 0.76 - 1.27 mg/dL Final   • eGFR Non  Am 08/25/2021 59* >60 mL/min/1.73 Final    Comment: GFR Normal >60  Chronic Kidney Disease <60  Kidney Failure <15     • eGFR  Am 08/25/2021 71  >60 mL/min/1.73 Final   • BUN/Creatinine Ratio 08/25/2021 12.9  7.0 - 25.0 Final   • Sodium 08/25/2021 144  136 - 145 mmol/L Final   • Potassium 08/25/2021 4.8  3.5 - 5.2 mmol/L Final    Comment: Slight hemolysis detected by analyzer. Results may be  affected.     • Chloride 08/25/2021 103  98 - 107 mmol/L Final   • Total CO2 08/25/2021 26.5  22.0 - 29.0 mmol/L Final   • Calcium 08/25/2021 9.9  8.6 - 10.5 mg/dL Final   • Total Protein 08/25/2021 6.5  6.0 - 8.5 g/dL Final   • Albumin 08/25/2021 4.60  3.50 - 5.20 g/dL Final   • Globulin 08/25/2021 1.9  gm/dL Final   • A/G Ratio 08/25/2021 2.4  g/dL Final   • Total Bilirubin 08/25/2021 0.8  0.0 - 1.2 mg/dL Final   • Alkaline Phosphatase 08/25/2021 53  39 - 117 U/L Final   • AST (SGOT) 08/25/2021 21  1 - 40 U/L Final   • ALT (SGPT) 08/25/2021 27  1 - 41 U/L Final   • Total Cholesterol 08/25/2021  226* 0 - 200 mg/dL Final    Comment: Cholesterol Reference Ranges  (U.S. Department of Health and Human Services ATP III  Classifications)  Desirable          <200 mg/dL  Borderline High    200-239 mg/dL  High Risk          >240 mg/dL  Triglyceride Reference Ranges  (U.S. Department of Health and Human Services ATP III  Classifications)  Normal           <150 mg/dL  Borderline High  150-199 mg/dL  High             200-499 mg/dL  Very High        >500 mg/dL  HDL Reference Ranges  (U.S. Department of Health and Human Services ATP III  Classifcations)  Low     <40 mg/dl (major risk factor for CHD)  High    >60 mg/dl ('negative' risk factor for CHD)  LDL Reference Ranges  (U.S. Department of Health and Human Services ATP III  Classifcations)  Optimal          <100 mg/dL  Near Optimal     100-129 mg/dL  Borderline High  130-159 mg/dL  High             160-189 mg/dL  Very High        >189 mg/dL     • Triglycerides 08/25/2021 74  0 - 150 mg/dL Final   • HDL Cholesterol 08/25/2021 52  40 - 60 mg/dL Final   • VLDL Cholesterol Ayush 08/25/2021 13  5 - 40 mg/dL Final   • LDL Chol Calc ([facility]) 08/25/2021 161* 0 - 100 mg/dL Final   • Chol/HDL Ratio 08/25/2021 4.35   Final   • Testosterone, Total 08/25/2021 489  264 - 916 ng/dL Final    Comment: Adult male reference interval is based on a population of  healthy nonobese males (BMI <30) between 19 and 39 years old.  Madison et.al. JCEM 2017,102;7763-8887. PMID: [id number].     • Testosterone, Free 08/25/2021 9.3  6.8 - 21.5 pg/mL Final     Reviewed labs with patient.     Assessment/Plan   Healthy male exam.      1. Diagnoses and all orders for this visit:    1. Healthcare maintenance (Primary)    2. Anxiety  -     Vortioxetine HBr (Trintellix) 10 MG tablet; Take 10 mg by mouth Daily.  Dispense: 30 tablet; Refill: 2    3. Essential hypertension  -     metoprolol succinate XL (Toprol XL) 50 MG 24 hr tablet; Take 1 tablet by mouth Daily.  Dispense: 90 tablet; Refill: 1    4.  Gastroesophageal reflux disease, unspecified whether esophagitis present  -     Ambulatory Referral to Gastroenterology    5. Dysphagia, unspecified type  -     Ambulatory Referral to Gastroenterology      Anxiety - will try trintellix. He was given 2 weeks worth of the 5 mg samples. F/u in 4-6 weeks    HTN - will restart patient on metoprolol    2. Patient Counseling:  --Nutrition: Stressed importance of moderation in sodium/caffeine intake, saturated fat and cholesterol, caloric balance, sufficient intake of fresh fruits, vegetables, fiber, calcium, iron.  --Exercise: Stressed the importance of regular exercise.   --Injury prevention: Discussed safety belts, safety helmets, smoke detector.   --Dental health: Discussed importance of regular tooth brushing, flossing, and dental visits.   --Immunizations reviewed.    3. Follow up 4-6 weeks to reassess anxiety

## 2021-08-31 DIAGNOSIS — F41.9 ANXIETY: Primary | ICD-10-CM

## 2021-08-31 RX ORDER — VILAZODONE HYDROCHLORIDE 10 MG-20MG
10 KIT ORAL DAILY
Qty: 1 KIT | Refills: 0 | Status: SHIPPED | OUTPATIENT
Start: 2021-08-31 | End: 2021-11-12

## 2021-09-20 ENCOUNTER — TELEPHONE (OUTPATIENT)
Dept: INTERNAL MEDICINE | Facility: CLINIC | Age: 46
End: 2021-09-20

## 2021-09-20 NOTE — TELEPHONE ENCOUNTER
PATIENT CALLING STATES THE LAST MEDICATION CALLED IN( DOES NOT KNOW THE NAME) FOR HIS ANXIETY COST OVER $300 AT PHARMACY WANTING TO KNOW IF THERE IS ANOTHER MEDICATION THAT WOULD BE CHEAPER HE COULD TRY     CALLBACK # [phone]    PHARMACY : [address] - [phone]  - [phone] FX

## 2021-09-22 ENCOUNTER — TELEPHONE (OUTPATIENT)
Dept: INTERNAL MEDICINE | Facility: CLINIC | Age: 46
End: 2021-09-22

## 2021-09-22 NOTE — TELEPHONE ENCOUNTER
----- Message from Jovita Coley sent at 9/22/2021  9:23 AM EDT -----  Regarding: MED COST  Patient has called back about the Vibryd. He said with the coupon it is still over $300 at [facility]. He is very upset and said his blood pressure was running in the 140-150/100-110 area. He would like a call back today to figure out what is going on and what he can do. Please call him at [phone]. Thanks

## 2021-09-22 NOTE — TELEPHONE ENCOUNTER
Pt states he tried to use the coupon and it is still over 300$, cannot afford, I give him option to come  the samples for you till I talk to you but he said he have samples of trintellix and he will take that. He think his bp is up due to Anxiety. He will monitor his bp later after he took trintellix and let us know if it is still high.

## 2021-09-23 NOTE — TELEPHONE ENCOUNTER
Pt states his bp today is 138/88 after taking triantellix. I mail the list of psych to his address. Rupa says we are not increasing his metoprolol since his bp has came down.

## 2021-11-12 ENCOUNTER — TELEMEDICINE (OUTPATIENT)
Dept: FAMILY MEDICINE CLINIC | Facility: TELEHEALTH | Age: 46
End: 2021-11-12

## 2021-11-12 DIAGNOSIS — Z20.822 SUSPECTED COVID-19 VIRUS INFECTION: Primary | ICD-10-CM

## 2021-11-12 PROCEDURE — 99214 OFFICE O/P EST MOD 30 MIN: CPT | Performed by: NURSE PRACTITIONER

## 2021-11-12 RX ORDER — ALBUTEROL SULFATE 90 UG/1
AEROSOL, METERED RESPIRATORY (INHALATION)
COMMUNITY
Start: 2021-11-12 | End: 2022-04-28

## 2021-11-12 RX ORDER — AZITHROMYCIN 250 MG/1
TABLET, FILM COATED ORAL
COMMUNITY
Start: 2021-11-12 | End: 2022-04-28

## 2021-11-13 NOTE — PATIENT INSTRUCTIONS
Go to the [facility] near you for COVID-19 testing. You will need to go within 24hrs  Let them know you have an order. We will call you with the results. Advised rest, Tylenol or ibuprofen per package directions as needed for fever/pain; increase fluids; symptom management with OTC medications specifically for persons with high blood pressure (Coricedin).    SELF QUARANTINE until you meet the following criteria:  -It has been at least 10 days from the onset of your symptoms,   -A minimum of 24 hours fever free without fever reducing medicine   -AND improved symptoms of cough, shortness of air, difficulty breathing, sore throat, muscle aches or body aches, chills, runny nose or congestion, loss of sense of taste or smell, nausea or vomiting, headache, diarrhea.      **Wear a cloth or surgical mask for 14 days or until symptoms have resolved**     If symptoms worsen or do not improve follow up with your PCP or visit your nearest Urgent Care Center or ER.      How to Quarantine at Home  Information for Patients and Families    These instructions are for people with confirmed or suspected COVID-19 who do not need to be hospitalized and those with confirmed COVID-19 who were hospitalized and discharged to care for themselves at home.    If you were tested through the Health Department  The Health Department will monitor your wellbeing.  If it is determined that you do not need to be hospitalized and can be isolated at home, you will be monitored by staff from your local or state health department.     If you were tested through a Commercial Lab  You will need to monitor yourself and report changes in your symptoms to your doctor.  See the section below called Monitor Your Symptoms.    Follow these steps until a healthcare provider or local or state health department says you can return to your normal activities.    Stay home except to get medical care  • Restrict activities outside your home, except for  getting medical care.   • Do not go to work, school, or public areas.   • Avoid using public transportation, ride-sharing, or taxis.    Separate yourself from other people and animals in your home  People  As much as possible, you should stay in a specific room and away from other people in your home. Also, you should use a separate bathroom, if available.    Animals  You should restrict contact with pets and other animals while you are sick with COVID-19, just like you would around other people. When possible, have another member of your household care for your animals while you are sick. If you are sick with COVID-19, avoid contact with your pet, including petting, snuggling, being kissed or licked, and sharing food. If you must care for your pet or be around animals while you are sick, wash your hands before and after you interact with pets and wear a facemask. See COVID-19 and Animals for more information.    Call ahead before visiting your doctor  If you have a medical appointment, call the healthcare provider and tell them that you have or may have COVID-19. This information will help the healthcare provider’s office take steps to keep other people from getting infected or exposed.    Wear a facemask  You should wear a facemask when you are around other people (e.g., sharing a room or vehicle) or pets and before you enter a healthcare provider’s office.     If you are not able to wear a facemask (for example, because it causes trouble breathing), then people who live with you should not stay in the same room with you, or they should wear a facemask if they enter your room.    Cover your coughs and sneezes  • Cover your mouth and nose with a tissue when you cough or sneeze.   • Throw used tissues in a lined trash can.   • Immediately wash your hands with soap and water for at least 20 seconds or, if soap and water are not available, clean your hands with an alcohol-based hand  that contains at least  60% alcohol.    Clean your hands often  • Wash your hands often with soap and water for at least 20 seconds, especially after blowing your nose, coughing, or sneezing; going to the bathroom; and before eating or preparing food.     • If soap and water are not readily available, use an alcohol-based hand  with at least 60% alcohol, covering all surfaces of your hands and rubbing them together until they feel dry.    • Soap and water are the best option if hands are visibly dirty. Avoid touching your eyes, nose, and mouth with unwashed hands.    Avoid sharing personal household items  • You should not share dishes, drinking glasses, cups, eating utensils, towels, or bedding with other people or pets in your home.   • After using these items, they should be washed thoroughly with soap and water.    Clean all “high-touch” surfaces everyday  • High touch surfaces include counters, tabletops, doorknobs, bathroom fixtures, toilets, phones, keyboards, tablets, and bedside tables.   • Also, clean any surfaces that may have blood, stool, or body fluids on them.   • Use a household cleaning spray or wipe, according to the label instructions. Labels contain instructions for safe and effective use of the cleaning product, including precautions you should take when applying the product, such as wearing gloves and making sure you have good ventilation during use of the product.    Monitor your symptoms  • Seek prompt medical attention if your illness is worsening (e.g., difficulty breathing).   • Before seeking care, call your healthcare provider and tell them that you have, or are being evaluated for, COVID-19.   • Put on a facemask before you enter the facility.     • These steps will help the healthcare provider’s office to keep other people in the office or waiting room from getting infected or exposed.   • Persons who are placed under active monitoring or facilitated self-monitoring should follow instructions  provided by their local health department or occupational health professionals, as appropriate.  • If you have a medical emergency and need to call 911, notify the dispatch personnel that you have, or are being evaluated for COVID-19. If possible, put on a facemask before emergency medical services arrive.    Discontinuing home isolation  Patients with confirmed COVID-19 should remain under home isolation precautions until the risk of secondary transmission to others is thought to be low. The decision to discontinue home isolation precautions should be made on a case-by-case basis, in consultation with healthcare providers and [facility] health departments.    The below content are for household members, intimate partners, and caregivers of a patient with symptomatic laboratory-confirmed COVID-19 or a patient under investigation:    Household members, intimate partners, and caregivers may have close contact with a person with symptomatic, laboratory-confirmed COVID-19 or a person under investigation.     Close contacts should monitor their health; they should call their healthcare provider right away if they develop symptoms suggestive of COVID-19 (e.g., fever, cough, shortness of breath)     Close contacts should also follow these recommendations:  • Make sure that you understand and can help the patient follow their healthcare provider’s instructions for medication(s) and care. You should help the patient with basic needs in the home and provide support for getting groceries, prescriptions, and other personal needs.  • Monitor the patient’s symptoms. If the patient is getting sicker, call his or her healthcare provider and tell them that the patient has laboratory-confirmed COVID-19. This will help the healthcare provider’s office take steps to keep other people in the office or waiting room from getting infected. Ask the healthcare provider to call the local or [facility] health department for additional  guidance. If the patient has a medical emergency and you need to call 911, notify the dispatch personnel that the patient has, or is being evaluated for COVID-19.  • Household members should stay in another room or be  from the patient as much as possible. Household members should use a separate bedroom and bathroom, if available.  • Prohibit visitors who do not have an essential need to be in the home.  • Household members should care for any pets in the home. Do not handle pets or other animals while sick.  For more information, see COVID-19 and Animals.  • Make sure that shared spaces in the home have good air flow, such as by an air conditioner or an opened window, weather permitting.  • Perform hand hygiene frequently. Wash your hands often with soap and water for at least 20 seconds or use an alcohol-based hand  that contains 60 to 95% alcohol, covering all surfaces of your hands and rubbing them together until they feel dry. Soap and water should be used preferentially if hands are visibly dirty.  • Avoid touching your eyes, nose, and mouth with unwashed hands.  • The patient should wear a facemask when you are around other people. If the patient is not able to wear a facemask (for example, because it causes trouble breathing), you, as the caregiver, should wear a mask when you are in the same room as the patient.  • Wear a disposable facemask and gloves when you touch or have contact with the patient’s blood, stool, or body fluids, such as saliva, sputum, nasal mucus, vomit, or urine.   o Throw out disposable facemasks and gloves after using them. Do not reuse.  o When removing personal protective equipment, first remove and dispose of gloves. Then, immediately clean your hands with soap and water or alcohol-based hand . Next, remove and dispose of facemask, and immediately clean your hands again with soap and water or alcohol-based hand .  • Avoid sharing household items  with the patient. You should not share dishes, drinking glasses, cups, eating utensils, towels, bedding, or other items. After the patient uses these items, you should wash them thoroughly (see below “Wash laundry thoroughly”).  • Clean all “high-touch” surfaces, such as counters, tabletops, doorknobs, bathroom fixtures, toilets, phones, keyboards, tablets, and bedside tables, every day. Also, clean any surfaces that may have blood, stool, or body fluids on them.   o Use a household cleaning spray or wipe, according to the label instructions. Labels contain instructions for safe and effective use of the cleaning product including precautions you should take when applying the product, such as wearing gloves and making sure you have good ventilation during use of the product.  • Wash laundry thoroughly.   o Immediately remove and wash clothes or bedding that have blood, stool, or body fluids on them.  o Wear disposable gloves while handling soiled items and keep soiled items away from your body. Clean your hands (with soap and water or an alcohol-based hand ) immediately after removing your gloves.  o Read and follow directions on labels of laundry or clothing items and detergent. In general, using a normal laundry detergent according to washing machine instructions and dry thoroughly using the warmest temperatures recommended on the clothing label.  • Place all used disposable gloves, facemasks, and other contaminated items in a lined container before disposing of them with other household waste. Clean your hands (with soap and water or an alcohol-based hand ) immediately after handling these items. Soap and water should be used preferentially if hands are visibly dirty.  • Discuss any additional questions with your state or local health department or healthcare provider.    Adapted from information provided by the Centers for Disease Control and Prevention.  For more information, visit  https://www.cdc.gov/coronavirus/2019-ncov/hcp/guidance-prevent-spread.html  10 Things You Can Do to Manage Your COVID-19 Symptoms at Home  If you have possible or confirmed COVID-19:  1. Stay home except to get medical care.  2. Monitor your symptoms carefully. If your symptoms get worse, call your healthcare provider immediately.  3. Get rest and stay hydrated.  4. If you have a medical appointment, call the healthcare provider ahead of time and tell them that you have or may have COVID-19.  5. For medical emergencies, call 911 and notify the dispatch personnel that you have or may have COVID-19.  6. Cover your cough and sneezes with a tissue or use the inside of your elbow.  7. Wash your hands often with soap and water for at least 20 seconds or clean your hands with an alcohol-based hand  that contains at least 60% alcohol.  8. As much as possible, stay in a specific room and away from other people in your home. Also, you should use a separate bathroom, if available. If you need to be around other people in or outside of the home, wear a mask.  9. Avoid sharing personal items with other people in your household, like dishes, towels, and bedding.  10. Clean all surfaces that are touched often, like counters, tabletops, and doorknobs. Use household cleaning sprays or wipes according to the label instructions.  cdc.gov/coronavirus  07/16/2021  This information is not intended to replace advice given to you by your health care provider. Make sure you discuss any questions you have with your health care provider.  Document Revised: 08/04/2021 Document Reviewed: 08/04/2021  Elsevier Patient Education © 2021 [employer]

## 2021-11-13 NOTE — PROGRESS NOTES
You have chosen to receive care through a telehealth visit.  Do you consent to use a video/audio connection for your medical care today? Yes     CHIEF COMPLAINT  Chief Complaint   Patient presents with   • Exposure To Known Illness         HPI  Colby Nicole is a 46 y.o. male  presents with complaint of exposure to COVID. His child tested positive on Monday. He took a home test today that is positive. He reports nasal congestion sinus symptoms that started 3-4 days ago, and today has fever of 100.1 and loss of taste and smell. Denies headache, earache, sore throat, fatigue, body aches, cough, CP, or SOA. He states he did a wellness visit with a virtual MD yesterday for what he thought was a sinus infection and was prescribed a Z-pack and albuterol inhaler, however when he developed fever and loss of taste and smell, he decided to test for COVID. He is possibly interested in the monoclonal antibodies, however he will need a PCR test for that.     Review of Systems   Constitutional: Positive for fever. Negative for activity change, appetite change, chills and fatigue.   HENT: Positive for congestion, rhinorrhea and sinus pressure. Negative for ear discharge, ear pain, postnasal drip, sinus pain, sneezing, sore throat, tinnitus, trouble swallowing and voice change.    Respiratory: Negative for cough, chest tightness, shortness of breath and wheezing.    Cardiovascular: Negative for chest pain.   Gastrointestinal: Negative for abdominal pain, diarrhea, nausea and vomiting.   Musculoskeletal: Negative for myalgias.   Skin: Negative for rash.   Neurological: Negative for headaches.   All other systems reviewed and are negative.      Past Medical History:   Diagnosis Date   • Anxiety    • Basal cell carcinoma 09/24/2020    left temple; MOHS surgery   • GERD (gastroesophageal reflux disease)    • Heart palpitations    • Hypertension        Family History   Problem Relation Age of Onset   • No Known Problems Mother    •  No Known Problems Father        Social History     Socioeconomic History   • Marital status: Single   Tobacco Use   • Smoking status: Former Smoker     Packs/day: 0.50     Years: 21.00     Pack years: 10.50     Types: Cigarettes   • Smokeless tobacco: Former User   • Tobacco comment: QUIT 12/2017   Vaping Use   • Vaping Use: Never used   Substance and Sexual Activity   • Alcohol use: Yes     Alcohol/week: 6.0 standard drinks     Types: 6 Cans of beer per week     Comment: occasionally    • Drug use: No   • Sexual activity: Defer         There were no vitals taken for this visit.    PHYSICAL EXAM  Physical Exam   Constitutional: He is oriented to person, place, and time. He appears well-developed and well-nourished. He does not have a sickly appearance. He does not appear ill. No distress.   HENT:   Head: Normocephalic and atraumatic.   Right Ear: External ear normal.   Left Ear: External ear normal.   Nose: Congestion present.   Eyes: Conjunctivae and EOM are normal. Right eye exhibits no discharge. Left eye exhibits no discharge. No scleral icterus.   Pulmonary/Chest: Effort normal. No stridor.  No respiratory distress. He no audible wheeze...  Neurological: He is alert and oriented to person, place, and time.   Skin: Skin is dry.   Psychiatric: He mood appears anxious.           Diagnoses and all orders for this visit:    1. Suspected COVID-19 virus infection (Primary)  -     COVID-19,[facility] ROUTINE, NP/OP SWAB IN TRANSPORT MEDIA OR ESWAB 72 HR TAT - Swab, Anterior nasal; Future  -     QUESTIONNAIRE SERIES    Go to the [facility] near you for COVID-19 testing. You will need to go within 24hrs  Let them know you have an order. We will call you with the results. Advised rest, Tylenol or ibuprofen per package directions as needed for fever/pain; increase fluids; symptom management with OTC medications specifically for persons with high blood pressure (Coricedin).    SELF QUARANTINE until you meet the  following criteria:  -It has been at least 10 days from the onset of your symptoms,   -A minimum of 24 hours fever free without fever reducing medicine   -AND improved symptoms of cough, shortness of air, difficulty breathing, sore throat, muscle aches or body aches, chills, runny nose or congestion, loss of sense of taste or smell, nausea or vomiting, headache, diarrhea.      **Wear a cloth or surgical mask for 14 days or until symptoms have resolved**     If symptoms worsen or do not improve follow up with your PCP or visit your nearest Urgent Care Center or ER.      FOLLOW-UP  As discussed during visit with PCP if no improvement or Urgent Care/Emergency Department if worsening of symptoms    Patient verbalizes understanding of medication dosage, comfort measures, instructions for treatment and follow-up.    Dina Montoya, APRN  11/12/2021  19:47 EST    This visit was performed via Telehealth.  This patient has been instructed to follow-up with their primary care provider if their symptoms worsen or the treatment provided does not resolve their illness.    The use of a video visit has been reviewed with the patient and verbal informed consent has been obtained. Myself and Colby Nicole     participated in this visit. The patient is located in [place], [place]. I am located in [place], [place]. Mychart and Zoom were utilized. I spent  37 minutes in the patient's chart for this visit.

## 2021-12-31 ENCOUNTER — TELEMEDICINE (OUTPATIENT)
Dept: FAMILY MEDICINE CLINIC | Facility: TELEHEALTH | Age: 46
End: 2021-12-31

## 2021-12-31 VITALS — WEIGHT: 225 LBS | TEMPERATURE: 98.3 F | HEIGHT: 71 IN | BODY MASS INDEX: 31.5 KG/M2

## 2021-12-31 DIAGNOSIS — J32.0 MAXILLARY SINUSITIS, UNSPECIFIED CHRONICITY: Primary | ICD-10-CM

## 2021-12-31 PROCEDURE — 99213 OFFICE O/P EST LOW 20 MIN: CPT | Performed by: NURSE PRACTITIONER

## 2021-12-31 RX ORDER — LEVOCETIRIZINE DIHYDROCHLORIDE 2.5 MG/5ML
SOLUTION ORAL
COMMUNITY
End: 2022-12-07

## 2021-12-31 RX ORDER — PREDNISONE 20 MG/1
20 TABLET ORAL 2 TIMES DAILY
Qty: 14 TABLET | Refills: 0 | Status: SHIPPED | OUTPATIENT
Start: 2021-12-31 | End: 2022-01-07

## 2021-12-31 RX ORDER — AMOXICILLIN 875 MG/1
875 TABLET, COATED ORAL 2 TIMES DAILY
Qty: 20 TABLET | Refills: 0 | Status: SHIPPED | OUTPATIENT
Start: 2021-12-31 | End: 2022-01-10

## 2021-12-31 RX ORDER — GUAIFENESIN 600 MG/1
600 TABLET, EXTENDED RELEASE ORAL 2 TIMES DAILY
Qty: 28 TABLET | Refills: 0 | Status: SHIPPED | OUTPATIENT
Start: 2021-12-31 | End: 2022-01-14

## 2021-12-31 NOTE — PATIENT INSTRUCTIONS
Sinusitis, Adult  Sinusitis is inflammation of your sinuses. Sinuses are hollow spaces in the bones around your face. Your sinuses are located:  · Around your eyes.  · In the middle of your forehead.  · Behind your nose.  · In your cheekbones.  Mucus normally drains out of your sinuses. When your nasal tissues become inflamed or swollen, mucus can become trapped or blocked. This allows bacteria, viruses, and fungi to grow, which leads to infection. Most infections of the sinuses are caused by a virus.  Sinusitis can develop quickly. It can last for up to 4 weeks (acute) or for more than 12 weeks (chronic). Sinusitis often develops after a cold.  What are the causes?  This condition is caused by anything that creates swelling in the sinuses or stops mucus from draining. This includes:  · Allergies.  · Asthma.  · Infection from bacteria or viruses.  · Deformities or blockages in your nose or sinuses.  · Abnormal growths in the nose (nasal polyps).  · Pollutants, such as chemicals or irritants in the air.  · Infection from fungi (rare).  What increases the risk?  You are more likely to develop this condition if you:  · Have a weak body defense system (immune system).  · Do a lot of swimming or diving.  · Overuse nasal sprays.  · Smoke.  What are the signs or symptoms?  The main symptoms of this condition are pain and a feeling of pressure around the affected sinuses. Other symptoms include:  · Stuffy nose or congestion.  · Thick drainage from your nose.  · Swelling and warmth over the affected sinuses.  · Headache.  · Upper toothache.  · A cough that may get worse at night.  · Extra mucus that collects in the throat or the back of the nose (postnasal drip).  · Decreased sense of smell and taste.  · Fatigue.  · A fever.  · Sore throat.  · Bad breath.  How is this diagnosed?  This condition is diagnosed based on:  · Your symptoms.  · Your medical history.  · A physical exam.  · Tests to find out if your condition is  acute or chronic. This may include:  ? Checking your nose for nasal polyps.  ? Viewing your sinuses using a device that has a light (endoscope).  ? Testing for allergies or bacteria.  ? Imaging tests, such as an MRI or CT scan.  In rare cases, a bone biopsy may be done to rule out more serious types of fungal sinus disease.  How is this treated?  Treatment for sinusitis depends on the cause and whether your condition is chronic or acute.  · If caused by a virus, your symptoms should go away on their own within 10 days. You may be given medicines to relieve symptoms. They include:  ? Medicines that shrink swollen nasal passages (topical intranasal decongestants).  ? Medicines that treat allergies (antihistamines).  ? A spray that eases inflammation of the nostrils (topical intranasal corticosteroids).  ? Rinses that help get rid of thick mucus in your nose (nasal saline washes).  · If caused by bacteria, your health care provider may recommend waiting to see if your symptoms improve. Most bacterial infections will get better without antibiotic medicine. You may be given antibiotics if you have:  ? A severe infection.  ? A weak immune system.  · If caused by narrow nasal passages or nasal polyps, you may need to have surgery.  Follow these instructions at home:  Medicines  · Take, use, or apply over-the-counter and prescription medicines only as told by your health care provider. These may include nasal sprays.  · If you were prescribed an antibiotic medicine, take it as told by your health care provider. Do not stop taking the antibiotic even if you start to feel better.  Hydrate and humidify    · Drink enough fluid to keep your urine pale yellow. Staying hydrated will help to thin your mucus.  · Use a cool mist humidifier to keep the humidity level in your home above 50%.  · Inhale steam for 10-15 minutes, 3-4 times a day, or as told by your health care provider. You can do this in the bathroom while a hot shower is  running.  · Limit your exposure to cool or dry air.    Rest  · Rest as much as possible.  · Sleep with your head raised (elevated).  · Make sure you get enough sleep each night.  General instructions    · Apply a warm, moist washcloth to your face 3-4 times a day or as told by your health care provider. This will help with discomfort.  · Wash your hands often with soap and water to reduce your exposure to germs. If soap and water are not available, use hand .  · Do not smoke. Avoid being around people who are smoking (secondhand smoke).  · Keep all follow-up visits as told by your health care provider. This is important.    Contact a health care provider if:  · You have a fever.  · Your symptoms get worse.  · Your symptoms do not improve within 10 days.  Get help right away if:  · You have a severe headache.  · You have persistent vomiting.  · You have severe pain or swelling around your face or eyes.  · You have vision problems.  · You develop confusion.  · Your neck is stiff.  · You have trouble breathing.  Summary  · Sinusitis is soreness and inflammation of your sinuses. Sinuses are hollow spaces in the bones around your face.  · This condition is caused by nasal tissues that become inflamed or swollen. The swelling traps or blocks the flow of mucus. This allows bacteria, viruses, and fungi to grow, which leads to infection.  · If you were prescribed an antibiotic medicine, take it as told by your health care provider. Do not stop taking the antibiotic even if you start to feel better.  · Keep all follow-up visits as told by your health care provider. This is important.  This information is not intended to replace advice given to you by your health care provider. Make sure you discuss any questions you have with your health care provider.  Document Revised: 05/20/2019 Document Reviewed: 05/20/2019  [employer] Patient Education © 2021 [employer]

## 2022-04-28 ENCOUNTER — TELEMEDICINE (OUTPATIENT)
Dept: FAMILY MEDICINE CLINIC | Facility: TELEHEALTH | Age: 47
End: 2022-04-28

## 2022-04-28 DIAGNOSIS — J01.40 ACUTE PANSINUSITIS, RECURRENCE NOT SPECIFIED: Primary | ICD-10-CM

## 2022-04-28 PROCEDURE — 99213 OFFICE O/P EST LOW 20 MIN: CPT | Performed by: NURSE PRACTITIONER

## 2022-04-28 RX ORDER — METHYLPREDNISOLONE 4 MG/1
TABLET ORAL
Qty: 21 TABLET | Refills: 0 | Status: SHIPPED | OUTPATIENT
Start: 2022-04-28 | End: 2022-09-17 | Stop reason: ALTCHOICE

## 2022-04-28 RX ORDER — FAMOTIDINE 10 MG/ML
20 INJECTION, SOLUTION INTRAVENOUS
COMMUNITY
Start: 2021-11-16 | End: 2022-12-07

## 2022-04-28 RX ORDER — HYDROCORTISONE ACETATE 25 MG
SUPPOSITORY, RECTAL RECTAL
COMMUNITY
Start: 2022-04-07 | End: 2022-12-07

## 2022-04-28 RX ORDER — AMOXICILLIN AND CLAVULANATE POTASSIUM 875; 125 MG/1; MG/1
1 TABLET, FILM COATED ORAL 2 TIMES DAILY
Qty: 20 TABLET | Refills: 0 | Status: SHIPPED | OUTPATIENT
Start: 2022-04-28 | End: 2022-05-08

## 2022-04-28 RX ORDER — EPINEPHRINE 0.3 MG/.3ML
0.3 INJECTION SUBCUTANEOUS
COMMUNITY
Start: 2021-11-16 | End: 2022-09-17 | Stop reason: ALTCHOICE

## 2022-04-28 NOTE — PATIENT INSTRUCTIONS
Drink plenty of water  Over the counter pain relievers okay   If symptoms do not improve in 3-5 days follow up with your primary care provider or urgent care  If symptoms worsen follow up with urgent care or the emergency room      Sinusitis, Adult  Sinusitis is soreness and swelling (inflammation) of your sinuses. Sinuses are hollow spaces in the bones around your face. They are located:  Around your eyes.  In the middle of your forehead.  Behind your nose.  In your cheekbones.  Your sinuses and nasal passages are lined with a fluid called mucus. Mucus drains out of your sinuses. Swelling can trap mucus in your sinuses. This lets germs (bacteria, virus, or fungus) grow, which leads to infection. Most of the time, this condition is caused by a virus.  What are the causes?  This condition is caused by:  Allergies.  Asthma.  Germs.  Things that block your nose or sinuses.  Growths in the nose (nasal polyps).  Chemicals or irritants in the air.  Fungus (rare).  What increases the risk?  You are more likely to develop this condition if:  You have a weak body defense system (immune system).  You do a lot of swimming or diving.  You use nasal sprays too much.  You smoke.  What are the signs or symptoms?  The main symptoms of this condition are pain and a feeling of pressure around the sinuses. Other symptoms include:  Stuffy nose (congestion).  Runny nose (drainage).  Swelling and warmth in the sinuses.  Headache.  Toothache.  A cough that may get worse at night.  Mucus that collects in the throat or the back of the nose (postnasal drip).  Being unable to smell and taste.  Being very tired (fatigue).  A fever.  Sore throat.  Bad breath.  How is this diagnosed?  This condition is diagnosed based on:  Your symptoms.  Your medical history.  A physical exam.  Tests to find out if your condition is short-term (acute) or long-term (chronic). Your doctor may:  Check your nose for growths (polyps).  Check your sinuses using a  tool that has a light (endoscope).  Check for allergies or germs.  Do imaging tests, such as an MRI or CT scan.  How is this treated?  Treatment for this condition depends on the cause and whether it is short-term or long-term.  If caused by a virus, your symptoms should go away on their own within 10 days. You may be given medicines to relieve symptoms. They include:  Medicines that shrink swollen tissue in the nose.  Medicines that treat allergies (antihistamines).  A spray that treats swelling of the nostrils.   Rinses that help get rid of thick mucus in your nose (nasal saline washes).  If caused by bacteria, your doctor may wait to see if you will get better without treatment. You may be given antibiotic medicine if you have:  A very bad infection.  A weak body defense system.  If caused by growths in the nose, you may need to have surgery.  Follow these instructions at home:  Medicines  Take, use, or apply over-the-counter and prescription medicines only as told by your doctor. These may include nasal sprays.  If you were prescribed an antibiotic medicine, take it as told by your doctor. Do not stop taking the antibiotic even if you start to feel better.  Hydrate and humidify    Drink enough water to keep your pee (urine) pale yellow.  Use a cool mist humidifier to keep the humidity level in your home above 50%.  Breathe in steam for 10-15 minutes, 3-4 times a day, or as told by your doctor. You can do this in the bathroom while a hot shower is running.  Try not to spend time in cool or dry air.    Rest  Rest as much as you can.  Sleep with your head raised (elevated).  Make sure you get enough sleep each night.  General instructions    Put a warm, moist washcloth on your face 3-4 times a day, or as often as told by your doctor. This will help with discomfort.  Wash your hands often with soap and water. If there is no soap and water, use hand .  Do not smoke. Avoid being around people who are smoking  (secondhand smoke).  Keep all follow-up visits as told by your doctor. This is important.    Contact a doctor if:  You have a fever.  Your symptoms get worse.  Your symptoms do not get better within 10 days.  Get help right away if:  You have a very bad headache.  You cannot stop throwing up (vomiting).  You have very bad pain or swelling around your face or eyes.  You have trouble seeing.  You feel confused.  Your neck is stiff.  You have trouble breathing.  Summary  Sinusitis is swelling of your sinuses. Sinuses are hollow spaces in the bones around your face.  This condition is caused by tissues in your nose that become inflamed or swollen. This traps germs. These can lead to infection.  If you were prescribed an antibiotic medicine, take it as told by your doctor. Do not stop taking it even if you start to feel better.  Keep all follow-up visits as told by your doctor. This is important.  This information is not intended to replace advice given to you by your health care provider. Make sure you discuss any questions you have with your health care provider.  Document Revised: 05/20/2019 Document Reviewed: 05/20/2019  [employer] Patient Education © 2021 [employer]

## 2022-04-28 NOTE — PROGRESS NOTES
CHIEF COMPLAINT  Chief Complaint   Patient presents with   • Sinusitis         HPI  Colby Nicole is a 47 y.o. male  presents with complaint of sinus infection. He gets this 1-2 times per year. He has been sick and the sinus symptoms have worsened. He has a cough, but this has improved.     Review of Systems   Constitutional: Negative for chills, diaphoresis, fatigue and fever.   HENT: Positive for congestion, postnasal drip, sinus pressure and sinus pain. Negative for rhinorrhea, sneezing and sore throat.    Respiratory: Positive for cough. Negative for chest tightness, shortness of breath and wheezing.    Cardiovascular: Negative for chest pain.   Gastrointestinal: Negative for diarrhea, nausea and vomiting.   Neurological: Positive for headaches.   Hematological: Negative for adenopathy.       Past Medical History:   Diagnosis Date   • Anxiety    • Basal cell carcinoma 09/24/2020    left temple; MOHS surgery   • GERD (gastroesophageal reflux disease)    • Heart palpitations    • Hypertension        Family History   Problem Relation Age of Onset   • No Known Problems Mother    • No Known Problems Father        Social History     Socioeconomic History   • Marital status: Single   Tobacco Use   • Smoking status: Former Smoker     Packs/day: 0.50     Years: 21.00     Pack years: 10.50     Types: Cigarettes   • Smokeless tobacco: Former User   • Tobacco comment: QUIT 12/2017   Vaping Use   • Vaping Use: Never used   Substance and Sexual Activity   • Alcohol use: Yes     Alcohol/week: 6.0 standard drinks     Types: 6 Cans of beer per week     Comment: occasionally    • Drug use: No   • Sexual activity: Defer       Colby Nicole  reports that he has quit smoking. His smoking use included cigarettes. He has a 10.50 pack-year smoking history. He has quit using smokeless tobacco.. No tobacco use               There were no vitals taken for this visit.    PHYSICAL EXAM  Physical Exam   Constitutional: He is oriented  to person, place, and time. He appears well-developed and well-nourished. He does not have a sickly appearance. He does not appear ill. No distress.   HENT:   Head: Normocephalic and atraumatic.   Neck: Neck normal appearance.  Pulmonary/Chest: Effort normal.  No respiratory distress.  Neurological: He is alert and oriented to person, place, and time.   Skin: Skin is dry.   Psychiatric: He has a normal mood and affect.         Diagnoses and all orders for this visit:    1. Acute pansinusitis, recurrence not specified (Primary)    Other orders  -     amoxicillin-clavulanate (Augmentin) 875-125 MG per tablet; Take 1 tablet by mouth 2 (Two) Times a Day for 10 days.  Dispense: 20 tablet; Refill: 0  -     methylPREDNISolone (MEDROL) 4 MG dose pack; Take as directed on package instructions.  Dispense: 21 tablet; Refill: 0        The use of a video visit has been reviewed with the patient and verbal informd consent has een obtained. Myself and Colby Nicole participated in this visit. The patient is located in [address] [place] [address]. I am located in [place], [place]. Mychart and Zoom were utilized. I spent 6 minutes in the patient's chart for this visit.           Clarissa Jones, VINCENT  04/28/2022  14:43 EDT

## 2022-09-17 ENCOUNTER — TELEMEDICINE (OUTPATIENT)
Dept: FAMILY MEDICINE CLINIC | Facility: TELEHEALTH | Age: 47
End: 2022-09-17

## 2022-09-17 DIAGNOSIS — J32.9 SINUSITIS, UNSPECIFIED CHRONICITY, UNSPECIFIED LOCATION: Primary | ICD-10-CM

## 2022-09-17 PROCEDURE — 99213 OFFICE O/P EST LOW 20 MIN: CPT | Performed by: NURSE PRACTITIONER

## 2022-09-17 RX ORDER — DEXTROMETHORPHAN HYDROBROMIDE AND PROMETHAZINE HYDROCHLORIDE 15; 6.25 MG/5ML; MG/5ML
5 SYRUP ORAL 4 TIMES DAILY PRN
Qty: 120 ML | Refills: 0 | Status: SHIPPED | OUTPATIENT
Start: 2022-09-17 | End: 2022-09-27

## 2022-09-17 RX ORDER — DOXYCYCLINE HYCLATE 100 MG/1
100 CAPSULE ORAL 2 TIMES DAILY
Qty: 14 CAPSULE | Refills: 0 | Status: SHIPPED | OUTPATIENT
Start: 2022-09-17 | End: 2022-09-24

## 2022-09-17 RX ORDER — METHYLPREDNISOLONE 4 MG/1
TABLET ORAL
Qty: 1 EACH | Refills: 0 | Status: SHIPPED | OUTPATIENT
Start: 2022-09-17 | End: 2022-12-07

## 2022-09-17 NOTE — PATIENT INSTRUCTIONS
Take antibiotic until gone. May take probiotic with antibiotic if prone to antibiotic induced diarrhea. Flonase is recommended for daily use by most ENTs if you have Allergic or Reactive sinus problems. It will help with nasal congestion, but takes several days to become fully effective and is not a fast acting medication. It is also recommended to start and continue Claritin, Zyrtec or Allegra daily to control postnasal drainage, if you are prone to reactive sinus issues due to weather or seasonal changes. Cool mist humidifier at bedside will help secretions remain thin and more easily drain, relieving the pressure in your sinuses. Follow up with PCP, Urgent Care or Video Visit if symptoms have not resolved in 7-10 days. If symptoms worse, go to Urgent Care or follow up with PCP. If you develop high fever, chest pain, shortness of breath or any life-threatening symptoms, go to nearest Emergency Department.

## 2022-09-17 NOTE — PROGRESS NOTES
You have chosen to receive care through a telehealth visit.The use of a video visit has been reviewed with the patient and verbal informed consent has been obtained. Video Options: MyChart/Zoom The visit included audio and video interaction. No technical issues occurred during this visit.  Pt Location: Home  Provider location: [facility]  Video Visit Reason:   Free Text Description: Cure infection    Chief Complaint  Nasal Congestion    Subjective          Colby Nicole presents to [facility]  He  has a past medical history of Anxiety, Basal cell carcinoma (09/24/2020), GERD (gastroesophageal reflux disease), Heart palpitations, and Hypertension.  He does not have any pertinent problems on file.  Current Outpatient Medications   Medication Sig Dispense Refill   • Anucort-HC 25 MG suppository INSERT 1 SUPPOSITORY RECTALLY TWICE DAILY TAKE PRIOR TO PROCEDURE AS DIRECTED     • azelastine (ASTEPRO) 0.15 % solution nasal spray 1 spray into the nostril(s) as directed by provider Daily. 30 mL 0   • doxycycline (VIBRAMYCIN) 100 MG capsule Take 1 capsule by mouth 2 (Two) Times a Day for 7 days. 14 capsule 0   • Famotidine, PF, (PEPCID) 20 MG/2ML solution injection Infuse 20 mg into a venous catheter.     • hyoscyamine (LEVSIN) 0.125 MG SL tablet Take 1 tablet by mouth As Needed.     • levocetirizine (XYZAL) 2.5 MG/5ML solution Take  by mouth.     • methylPREDNISolone (MEDROL) 4 MG dose pack Take as directed on package instructions. 1 each 0   • metoprolol succinate XL (Toprol XL) 50 MG 24 hr tablet Take 1 tablet by mouth Daily. 90 tablet 1   • promethazine-dextromethorphan (PROMETHAZINE-DM) 6.25-15 MG/5ML syrup Take 5 mL by mouth 4 (Four) Times a Day As Needed for Cough for up to 10 days. 120 mL 0     No current facility-administered medications for this visit.     He is allergic to tramadol, adhesive tape, and latex.  History of Present Illness  Nasal congestion, sinus congestion, facial  pain, pressure and fatigue. He started with what he thought was allergy symptoms over a week ago but has progressed to what feels like past sinus infections. He has tried mucinex along with his daily xyzal and nasal azelastine but symptoms are not improving and it now feels like it is in the chest. Cough is nonproductive mostly but when he does get something out it is thick, green and hard to get up. He has worse cough at night and when he tries to lie down, disrupting his sleep. He has taken 2 Covid tests that have been negative.    Past Medical History:   Diagnosis Date   • Anxiety    • Basal cell carcinoma 09/24/2020    left temple; MOHS surgery   • GERD (gastroesophageal reflux disease)    • Heart palpitations    • Hypertension        Objective   Vital Signs:   There were no vitals taken for this visit.    Physical Exam   Constitutional: He appears well-developed and well-nourished. He appears ill.   HENT:   Nose: Congestion present. Right sinus exhibits maxillary sinus tenderness. Left sinus exhibits maxillary sinus tenderness.   Pulmonary/Chest: Effort normal.  No respiratory distress.  Neurological: He is alert.     Result Review :                 Assessment and Plan    Diagnoses and all orders for this visit:    1. Sinusitis, unspecified chronicity, unspecified location (Primary)  -     doxycycline (VIBRAMYCIN) 100 MG capsule; Take 1 capsule by mouth 2 (Two) Times a Day for 7 days.  Dispense: 14 capsule; Refill: 0  -     methylPREDNISolone (MEDROL) 4 MG dose pack; Take as directed on package instructions.  Dispense: 1 each; Refill: 0  -     promethazine-dextromethorphan (PROMETHAZINE-DM) 6.25-15 MG/5ML syrup; Take 5 mL by mouth 4 (Four) Times a Day As Needed for Cough for up to 10 days.  Dispense: 120 mL; Refill: 0        Take antibiotic until gone. May take probiotic with antibiotic if prone to antibiotic induced diarrhea. Flonase is recommended for daily use by most ENTs if you have Allergic or Reactive  sinus problems. It will help with nasal congestion, but takes several days to become fully effective and is not a fast acting medication. It is also recommended to start and continue Claritin, Zyrtec or Allegra daily to control postnasal drainage, if you are prone to reactive sinus issues due to weather or seasonal changes. Cool mist humidifier at bedside will help secretions remain thin and more easily drain, relieving the pressure in your sinuses. Follow up with PCP, Urgent Care or Video Visit if symptoms have not resolved in 7-10 days. If symptoms worse, go to Urgent Care or follow up with PCP. If you develop high fever, chest pain, shortness of breath or any life-threatening symptoms, go to nearest Emergency Department.        I spent 20 minutes caring for Colby on this date of service. This time includes time spent by me in the following activities:preparing for the visit, obtaining and/or reviewing a separately obtained history, performing a medically appropriate examination and/or evaluation , counseling and educating the patient/family/caregiver, ordering medications, tests, or procedures, and documenting information in the medical record  Follow Up   Return if symptoms worsen or fail to improve.  Patient was given instructions and counseling regarding his condition or for health maintenance advice. Please see specific information pulled into the AVS if appropriate.

## 2023-01-10 ENCOUNTER — TELEMEDICINE (OUTPATIENT)
Dept: FAMILY MEDICINE CLINIC | Facility: TELEHEALTH | Age: 48
End: 2023-01-10
Payer: COMMERCIAL

## 2023-01-10 VITALS — HEIGHT: 70 IN | WEIGHT: 220 LBS | BODY MASS INDEX: 31.5 KG/M2

## 2023-01-10 DIAGNOSIS — J01.40 ACUTE PANSINUSITIS, RECURRENCE NOT SPECIFIED: Primary | ICD-10-CM

## 2023-01-10 PROCEDURE — 99213 OFFICE O/P EST LOW 20 MIN: CPT | Performed by: NURSE PRACTITIONER

## 2023-01-10 RX ORDER — AMOXICILLIN 875 MG/1
875 TABLET, COATED ORAL 2 TIMES DAILY
Qty: 20 TABLET | Refills: 0 | Status: SHIPPED | OUTPATIENT
Start: 2023-01-10 | End: 2023-01-20

## 2023-01-10 RX ORDER — GUAIFENESIN 600 MG/1
600 TABLET, EXTENDED RELEASE ORAL 2 TIMES DAILY
Qty: 28 TABLET | Refills: 0 | Status: SHIPPED | OUTPATIENT
Start: 2023-01-10 | End: 2023-01-24

## 2023-01-10 NOTE — PROGRESS NOTES
You have chosen to receive care through a telehealth visit.  Do you consent to use a video/audio connection for your medical care today? Yes     CHIEF COMPLAINT  Cc: sinus problem    HPI  Colby Nicole is a 47 y.o. male  presents with complaint of sinus problem. He reports yellow/green nasal congestion and sinus pain and pressure. Additional symptoms that the patient is having are noted in the ROS portion of this visit. The patient reports sinus problems throughout the winter and that he takes sudafed about every other day for this. Additionally he reports in the past 3 days his symptoms have worsened and his nasal disharge has changed color to yellow/green. He is using Sinex as well as sudafed to treat his symptoms. He also reports hat steroids make his blood pressure go up too much.    Review of Systems   Constitutional: Positive for fatigue. Negative for fever.   HENT: Positive for congestion (yellow/green), postnasal drip, rhinorrhea, sinus pressure and sinus pain. Negative for sore throat. Sneezing: resolved today.    Respiratory: Positive for cough.    Cardiovascular: Negative for chest pain.   Gastrointestinal: Negative for diarrhea and nausea.   Musculoskeletal: Positive for myalgias.   Neurological: Positive for headaches.       Past Medical History:   Diagnosis Date   • Anxiety    • Basal cell carcinoma 09/24/2020    left temple; MOHS surgery   • GERD (gastroesophageal reflux disease)    • Heart palpitations    • Hypertension        Family History   Problem Relation Age of Onset   • No Known Problems Mother    • No Known Problems Father        Social History     Socioeconomic History   • Marital status: Single   Tobacco Use   • Smoking status: Former     Packs/day: 0.50     Years: 21.00     Pack years: 10.50     Types: Cigarettes   • Smokeless tobacco: Former   • Tobacco comments:     QUIT 12/2017   Vaping Use   • Vaping Use: Never used   Substance and Sexual Activity   • Alcohol use: Yes      Alcohol/week: 6.0 standard drinks     Types: 6 Cans of beer per week     Comment: occasionally    • Drug use: No   • Sexual activity: Defer       Colby Nicole  reports that he has quit smoking. His smoking use included cigarettes. He has a 10.50 pack-year smoking history. He has quit using smokeless tobacco..     Ht 177.8 cm (70\")   Wt 99.8 kg (220 lb)   BMI 31.57 kg/m²     PHYSICAL EXAM  Physical Exam   Constitutional: He is oriented to person, place, and time. He appears well-developed and well-nourished.   HENT:   Head: Normocephalic and atraumatic.   Right Ear: External ear normal.   Left Ear: External ear normal.   Nose: Congestion present. Right sinus exhibits maxillary sinus tenderness (pateint reported) and frontal sinus tenderness (pateint reported). Left sinus exhibits maxillary sinus tenderness (pateint reported) and frontal sinus tenderness (pateint reported).   Eyes: Lids are normal. Right eye exhibits no discharge and no exudate. Left eye exhibits no discharge and no exudate. Right conjunctiva is not injected. Left conjunctiva is not injected.   Pulmonary/Chest: No accessory muscle usage. No tachypnea and no bradypnea.  No respiratory distress.No use of oxygen by nasal cannulaNo use of oxygen by mask noted.  Abdominal: Abdomen appears normal.   Neurological: He is alert and oriented to person, place, and time. No cranial nerve deficit.   Skin: His skin appears normal.  Psychiatric: He has a normal mood and affect. His speech is normal and behavior is normal. Judgment and thought content normal.       Results for orders placed or performed in visit on 07/12/21   Comprehensive Metabolic Panel    Specimen: Blood   Result Value Ref Range    Glucose 79 65 - 99 mg/dL    BUN 17 6 - 20 mg/dL    Creatinine 1.32 (H) 0.76 - 1.27 mg/dL    eGFR Non African Am 59 (L) >60 mL/min/1.73    eGFR African Am 71 >60 mL/min/1.73    BUN/Creatinine Ratio 12.9 7.0 - 25.0    Sodium 144 136 - 145 mmol/L    Potassium 4.8  3.5 - 5.2 mmol/L    Chloride 103 98 - 107 mmol/L    Total CO2 26.5 22.0 - 29.0 mmol/L    Calcium 9.9 8.6 - 10.5 mg/dL    Total Protein 6.5 6.0 - 8.5 g/dL    Albumin 4.60 3.50 - 5.20 g/dL    Globulin 1.9 gm/dL    A/G Ratio 2.4 g/dL    Total Bilirubin 0.8 0.0 - 1.2 mg/dL    Alkaline Phosphatase 53 39 - 117 U/L    AST (SGOT) 21 1 - 40 U/L    ALT (SGPT) 27 1 - 41 U/L   Lipid Panel With / Chol / HDL Ratio    Specimen: Blood   Result Value Ref Range    Total Cholesterol 226 (H) 0 - 200 mg/dL    Triglycerides 74 0 - 150 mg/dL    HDL Cholesterol 52 40 - 60 mg/dL    VLDL Cholesterol Ayush 13 5 - 40 mg/dL    LDL Chol Calc (NIH) 161 (H) 0 - 100 mg/dL    Chol/HDL Ratio 4.35    Testosterone, Free, Total    Specimen: Blood   Result Value Ref Range    Testosterone, Total 489 264 - 916 ng/dL    Testosterone, Free 9.3 6.8 - 21.5 pg/mL       Diagnoses and all orders for this visit:    1. Acute pansinusitis, recurrence not specified (Primary)    Other orders  -     amoxicillin (AMOXIL) 875 MG tablet; Take 1 tablet by mouth 2 (Two) Times a Day for 10 days.  Dispense: 20 tablet; Refill: 0  -     guaiFENesin (Mucinex) 600 MG 12 hr tablet; Take 1 tablet by mouth 2 (Two) Times a Day for 14 days.  Dispense: 28 tablet; Refill: 0    Continue Probiotics  Do not take within two hours of antibiotic.  Mucinex with plenty of fluids especially water to thin secretions and help with congestion.    FOLLOW-UP  If symptoms worsen or persist follow up with PCP, Virtual Care or Urgent Care    Patient verbalizes understanding of medication dosage, comfort measures, instructions for treatment and follow-up.    Bhumi Ribera, APRN  01/10/2023  15:18 EST    The use of a video visit has been reviewed with the patient and verbal informed consent has been obtained. Myself and Colby Nicole participated in this visit. The patient is located in [address] [place] [address].    I am located in [place], [place]. '[employer] and [employer] were  utilized. I spent 25 minutes in the patient's chart for this visit.

## 2023-01-10 NOTE — PATIENT INSTRUCTIONS
Sinusitis, Adult  Sinusitis is inflammation of your sinuses. Sinuses are hollow spaces in the bones around your face. Your sinuses are located:  Around your eyes.  In the middle of your forehead.  Behind your nose.  In your cheekbones.  Mucus normally drains out of your sinuses. When your nasal tissues become inflamed or swollen, mucus can become trapped or blocked. This allows bacteria, viruses, and fungi to grow, which leads to infection. Most infections of the sinuses are caused by a virus.  Sinusitis can develop quickly. It can last for up to 4 weeks (acute) or for more than 12 weeks (chronic). Sinusitis often develops after a cold.  What are the causes?  This condition is caused by anything that creates swelling in the sinuses or stops mucus from draining. This includes:  Allergies.  Asthma.  Infection from bacteria or viruses.  Deformities or blockages in your nose or sinuses.  Abnormal growths in the nose (nasal polyps).  Pollutants, such as chemicals or irritants in the air.  Infection from fungi (rare).  What increases the risk?  You are more likely to develop this condition if you:  Have a weak body defense system (immune system).  Do a lot of swimming or diving.  Overuse nasal sprays.  Smoke.  What are the signs or symptoms?  The main symptoms of this condition are pain and a feeling of pressure around the affected sinuses. Other symptoms include:  Stuffy nose or congestion.  Thick drainage from your nose.  Swelling and warmth over the affected sinuses.  Headache.  Upper toothache.  A cough that may get worse at night.  Extra mucus that collects in the throat or the back of the nose (postnasal drip).  Decreased sense of smell and taste.  Fatigue.  A fever.  Sore throat.  Bad breath.  How is this diagnosed?  This condition is diagnosed based on:  Your symptoms.  Your medical history.  A physical exam.  Tests to find out if your condition is acute or chronic. This may include:  Checking your nose for nasal  polyps.  Viewing your sinuses using a device that has a light (endoscope).  Testing for allergies or bacteria.  Imaging tests, such as an MRI or CT scan.  In rare cases, a bone biopsy may be done to rule out more serious types of fungal sinus disease.  How is this treated?  Treatment for sinusitis depends on the cause and whether your condition is chronic or acute.  If caused by a virus, your symptoms should go away on their own within 10 days. You may be given medicines to relieve symptoms. They include:  Medicines that shrink swollen nasal passages (topical intranasal decongestants).  Medicines that treat allergies (antihistamines).  A spray that eases inflammation of the nostrils (topical intranasal corticosteroids).  Rinses that help get rid of thick mucus in your nose (nasal saline washes).  If caused by bacteria, your health care provider may recommend waiting to see if your symptoms improve. Most bacterial infections will get better without antibiotic medicine. You may be given antibiotics if you have:  A severe infection.  A weak immune system.  If caused by narrow nasal passages or nasal polyps, you may need to have surgery.  Follow these instructions at home:  Medicines  Take, use, or apply over-the-counter and prescription medicines only as told by your health care provider. These may include nasal sprays.  If you were prescribed an antibiotic medicine, take it as told by your health care provider. Do not stop taking the antibiotic even if you start to feel better.  Hydrate and humidify    Drink enough fluid to keep your urine pale yellow. Staying hydrated will help to thin your mucus.  Use a cool mist humidifier to keep the humidity level in your home above 50%.  Inhale steam for 10-15 minutes, 3-4 times a day, or as told by your health care provider. You can do this in the bathroom while a hot shower is running.  Limit your exposure to cool or dry air.  Rest  Rest as much as possible.  Sleep with your  head raised (elevated).  Make sure you get enough sleep each night.  General instructions    Apply a warm, moist washcloth to your face 3-4 times a day or as told by your health care provider. This will help with discomfort.  Wash your hands often with soap and water to reduce your exposure to germs. If soap and water are not available, use hand .  Do not smoke. Avoid being around people who are smoking (secondhand smoke).  Keep all follow-up visits as told by your health care provider. This is important.  Contact a health care provider if:  You have a fever.  Your symptoms get worse.  Your symptoms do not improve within 10 days.  Get help right away if:  You have a severe headache.  You have persistent vomiting.  You have severe pain or swelling around your face or eyes.  You have vision problems.  You develop confusion.  Your neck is stiff.  You have trouble breathing.  Summary  Sinusitis is soreness and inflammation of your sinuses. Sinuses are hollow spaces in the bones around your face.  This condition is caused by nasal tissues that become inflamed or swollen. The swelling traps or blocks the flow of mucus. This allows bacteria, viruses, and fungi to grow, which leads to infection.  If you were prescribed an antibiotic medicine, take it as told by your health care provider. Do not stop taking the antibiotic even if you start to feel better.  Keep all follow-up visits as told by your health care provider. This is important.  This information is not intended to replace advice given to you by your health care provider. Make sure you discuss any questions you have with your health care provider.  Document Revised: 05/20/2019 Document Reviewed: 05/20/2019  [employer] Patient Education © 2022 [employer]

## 2023-02-02 ENCOUNTER — E-VISIT (OUTPATIENT)
Dept: FAMILY MEDICINE CLINIC | Facility: TELEHEALTH | Age: 48
End: 2023-02-02
Payer: COMMERCIAL

## 2023-02-02 PROCEDURE — BRIGHTMDVISIT: Performed by: NURSE PRACTITIONER

## 2023-02-02 NOTE — EXTERNAL PATIENT INSTRUCTIONS
Note   I think we should try a round of steroids to see if this helps. If you have had one round of antibiotics i doubt another will help that much. Drink increased water and take Mucinex. You might try OTC Coriciden HBP for sinus congestion. Adenike Bullock ApRN   Diagnosis   Bacterial sinusitis   My name is Adenike Bullock, and I'm a healthcare provider at [facility]. I reviewed your interview, and I see that you have bacterial sinusitis.   To prevent the spread of illness to others, I recommend that you stay home and away from other people as much as possible while you're sick. When you need to be around other people, consider wearing a face mask.   Medications   Your pharmacy   [facility] [phone] [facility] [address] [phone]     Prescription   Prednisone (20mg): Take 2 tablets (40mg) by mouth once daily for 5 days for asthma exacerbation.   Mucus Relief ER oral tablet, extended release (600mg): Take 1 tablet by mouth every 12 hours as needed for cough and congestion.    I've prescribed a 5-day course of oral steroids to help with your asthma flare. If your symptoms don't start to improve after 2 to 3 doses, or if they get worse, follow up with your care team right away. Continue taking all of your other asthma medications.   Non-prescription   Ibuprofen (200mg): Take 2 tablets by mouth every 8 hours as needed for up to 10 days for any fever, pain, or discomfort associated with your condition. Do not exceed 3200mg in a 24-hour period.   I've decided to avoid treatment with antibiotics at this time. Studies show that antibiotics may help with bacterial sinusitis. However, in many cases, patients develop additional symptoms that they didn't have before. Antibiotics can cause side effects such as nausea, diarrhea, and stomach pain. Keep a close eye on your symptoms. Most cases of sinusitis will get better on their own, without antibiotics. However, I can always add antibiotics to your  treatment plan if things get worse. If you notice symptoms in the When to seek care section, please contact us to adjust your treatment plan.   About your diagnosis   The sinuses are hollow spaces connected to the nasal passages. Sinusitis occurs when the sinuses swell and block the drainage of fluid and mucus from the nose, causing pain, pressure, and congestion. Fatigue, difficulty sleeping, or decreased appetite may accompany your symptoms.   More than 90% of sinus infections are caused by viruses. However, in certain cases, a sinus infection may be caused by bacteria. Bacterial sinus infections usually look like one of the following cases:    Severe sinus symptoms with a fever over 102F.    Sinus symptoms that have not improved at all after 10 days.    Cold symptoms that slowly improve but then worsen again after 5 or 6 days, usually with a high fever, headache, or nasal discharge.   What to expect   If you follow this treatment plan, you should start to feel better within a few days.   When to seek care   Call us at [phone]   with any sudden or unexpected symptoms.    Symptoms that last longer than 10 to 14 days.    Symptoms that get better for a few days, and then suddenly get worse.    Fever that measures over 103F or continues for more than 3 days.    Any vision changes.    A worsening headache.    Stiff neck.    Swelling of your forehead or eyes.    Coughing up red or bloody mucus.    Swallowing becomes extremely difficult or impossible.    More than 5 episodes of diarrhea in a day.    More than 5 episodes of vomiting in a day.    Severe shortness of breath.    Severe chest pain   Other treatment    Rest! Your body needs rest to recover and fight infection.    Drink plenty of water to stay hydrated.    Use steam to soothe your sinuses: Breathe it in from a shower or a bowl of hot water. Placing a warm, moist washcloth over your nose and forehead may help relieve the sinus pain and pressure.    Try  non-prescription saline nasal sprays to help your nasal symptoms. Try using a Neti Pot to flush out your stuffy nose and sinuses. Neti Pots are available at any drugstore without a prescription.    Avoid smoke and air pollution. Smoke can make infections worse.   Prevention    Avoid close contact with other people when you're sick.    Cover your mouth and nose when you cough or sneeze. Use a tissue or cough into your elbow. Make sure that used tissues go directly into the trash.    Avoid touching your eyes, nose, or mouth while you're sick.    Wash your hands often, especially after coughing, sneezing, or blowing your nose. If soap and water are not available, use an alcohol-based hand .    If you or someone in your home or workplace is sick, disinfect commonly used items. This includes door handles, tables, computers, remotes, and pens.    Coronavirus (COVID-19) information   Common symptoms of COVID-19 include fever, cough, shortness of breath, fatigue, muscle or body aches, headaches, new loss of sense of taste or smell, sore throat, stuffy or runny nose, nausea or vomiting, and diarrhea. Most people who get COVID-19 have mild symptoms and can rest at home until they get better. Elderly people and those with chronic medical problems may be at risk for more serious complications.   FAQs about the COVID-19 vaccine   There are four authorized COVID-19 vaccines: Sadi & Sadi's Iesha Vaccine (J&J/Iesha), Moderna, Novavax, and Pfizer-BioNTech (Pfizer). The J&J/Iesha and Novavax vaccines are approved for use in people aged 18 and older. The Moderna and Pfizer vaccines are approved for those aged 6 months and older. All four are available at no cost. Even if you don't have health insurance, you can still get the COVID-19 vaccine for free.   Which vaccine is the best? Which vaccine should I get?   All four vaccines are highly effective. Even if you get COVID-19 after being vaccinated, all of the  vaccines help prevent severe disease, hospitalization, and complications.   Most people should get whichever vaccine is first available to them. However, women younger than 50 years old should consider the rare risk of blood clots with low platelets after vaccination with the J&J/[facility] vaccine. This risk hasn't been seen with the other three vaccines.   Are the vaccines safe?   Yes. Hundreds of millions of people in the [place] have already safely received COVID-19 vaccines under the most intense safety monitoring in the history of the [place].   Do I need the vaccine if I've already had COVID?   Yes. Vaccination helps protect you even if you've already had COVID.   If you had COVID-19 and had symptoms, wait to get vaccinated until you've recovered and completed your isolation period.   If you tested positive for COVID-19 but did not have symptoms, you can get vaccinated after 5 full days have passed since you had a positive test, as long as you don't develop symptoms.   How many doses of the vaccine do I need?   Visit www.cdc.gov/coronavirus/2019-ncov/vaccines/stay-up-to-date.html   to find out how to stay up to date with your COVID-19 vaccines.   I'm immunocompromised. How many doses of the vaccine do I need?   For information on how immunocompromised people can stay up to date with their COVID-19 vaccines, visit www.cdc.gov/coronavirus/2019-ncov/vaccines/recommendations/immuno.html  .   What are the common side effects of the vaccine?   A sore arm, tiredness, headache, and muscle pain may occur within two days of getting the vaccine and last a day or two. For the Moderna or Pfizer vaccines, side effects are more common after the second dose. People over the age of 55 are less likely to have side effects than younger people.   After I'm up to date on vaccines, can I still get or spread COVID?   Yes, you can still get COVID, but your disease should be milder. And your risk of serious illness, hospitalization, and  complications will be much lower, especially if you're up to date. Unfortunately, you can still spread COVID if you've been vaccinated. That's why it's important to follow isolation guidelines if you get sick or test positive.   After I'm up to date on vaccines, can I go back to normal?   You should still wear a mask indoors in public if:    It's required by laws, rules, regulations, or local guidance.    You have a weakened immune system.    Your age puts you at increased risk of severe disease.    You have a medical condition that puts you at increased risk of severe disease.    Someone in your household has a weakened immune system, is at increased risk for severe disease, or is unvaccinated.    You're in an area of high transmission.   Where can I get a COVID-19 vaccine?   Visit [facility]'s website for more information. To find a COVID-19 vaccination site near you, visit www.[employer].gov/  , call [phone]  , or text your zip code to [id number] ([employer]). Message and data rates may apply.   What about travel?   Travel increases your risk of exposure to COVID-19. For more information, see www.cdc.gov/coronavirus/2019-ncov/travelers/index.html  .   I've had close contact with someone who has COVID. Do I need to quarantine, and if so, for how long?   For the most current answer, including a calculator to determine whether you need to stay home and for how long, visit www.cdc.gov/coronavirus/2019-ncov/your-health/quarantine-isolation.html  .   I've tested positive for COVID. How long do I need to isolate?   For the latest recommendations, including a calculator to determine how long you need to stay home, visit www.cdc.gov/coronavirus/2019-ncov/your-health/quarantine-isolation.html  .   What if I develop symptoms that might be from COVID?   For the latest recommendations on what to do if you're sick, including when to seek emergency care, visit  www.cdc.gov/coronavirus/2019-ncov/if-you-are-sick/steps-when-sick.html  .    Flu vaccine information   Who should get a flu vaccine?   Everyone 6 months of age and older should get a yearly flu vaccine.   When should I get vaccinated?   You should get a flu vaccine by the end of October. Once you're vaccinated, it takes about two weeks for antibodies to develop and protect you against the flu. That's why it's important to get vaccinated as soon as possible.   After October, is it too late to get vaccinated?   No. You should still get vaccinated. As long as the flu viruses are still in your community, flu vaccines will remain available, even into January of next year or later.   Why do I need a flu vaccine EVERY year?   Flu viruses are constantly changing, so flu vaccines are usually updated from one season to the next. Your protection from the flu vaccine also lessens over time.   Is the flu vaccine safe?   Yes. Over the last 50 years, hundreds of millions of Americans have safely received the flu vaccines.   What are the side effects of flu vaccines?   You CANNOT get the flu from a flu vaccine. Common side effects of the flu shot include soreness, redness and/or swelling where the shot was given, low grade fever, and aches. Common side effects of the nasal spray flu vaccine for adults include runny nose, headaches, sore throat, and cough. For children, side effects include wheezing, vomiting, muscle aches, and fever.   Does the flu vaccine increase your risk of getting COVID-19?   No. There is no evidence that getting a flu vaccine increases your risk of getting COVID-19.   Is it safe to get the flu vaccine along with a COVID-19 vaccine?   Yes. It's safe to get the flu vaccine with a COVID-19 vaccine or booster.   Contact your healthcare provider TODAY for details on when and where to get your flu vaccine.   Your provider   Your diagnosis was provided by Adenike Bullock, a member of your trusted care team at [facility].   If you have any questions, call us at [phone]  .

## 2023-02-02 NOTE — E-VISIT TREATED
Chief Complaint: Cold, flu, COVID, sinus, hay fever, or seasonal allergies   Patient introduction   Patient is 47-year-old male with cough, congestion, headache, and fatigue that started 2 to 3 weeks ago.   Systemic symptoms have been constant.   COVID-19 exposure, testing history, and vaccination status:    No known exposure to a person with a confirmed or suspected case of COVID-19.    No recent travel outside of their local community.    Patient did a self-test within the last 24 hours. Test result was negative.    Received 2 doses of the COVID-19 vaccine (Pfizer, Pfizer).    Received their most recent dose of the vaccine more than 14 days ago.   Risk factors for severe disease from COVID-19 infection:    BMI >= 25.    Does not smoke tobacco; smoked previously.   Warning. The following may warrant further investigation:    Constant headache lasting longer than 2 weeks.    Constant fatigue lasting longer than 2 weeks.    Headache described as severe (7 to 9 on a scale of 1 to 10).   Patient-submitted comments: Had this 2-3 weeks ago and never fully recovered .   Patient did not request an excuse note.   General presentation   Partial improvement of symptoms. Symptoms came on gradually.   Fever:    No fever.   Sinus and nasal symptoms:    Green nasal drainage.    Nasal drainage is thick.    Postnasal drip.    1 to 3 episodes of antibiotic treatment for sinus infection in the last year.    Current diagnosis of deviated septum.    Congestion with sinus pain or pressure on or around the eyes, nose, and upper teeth or jaw.    Patient first noticed sinus pain 2 to 4 weeks ago.    Sinus pain is not worse with Valsalva.    No nasal discharge.    No itchy nose or sneezing.    No history of unhealed nasal septal ulcer/nasal wound.   Throat symptoms:    No sore throat.   Head and body aches:    Headache described as severe (7 to 9 on a scale of 1 to 10).    Fatigue.    No sweats.    No chills.    No myalgia.   Cough:     Cough is worse at night/while sleeping.    Cough is productive of sputum.    Describes color of sputum as green.   Wheezing and shortness of breath:    No COPD diagnosis.    No asthma diagnosis.    No wheezing.    No shortness of breath.    No previous albuterol inhaler use for URIs, bronchitis, or pneumonia.    No previous steroid inhaler use for URIs, bronchitis, or pneumonia.   Chest pain:    No chest pain.   Ear symptoms:    Current symptoms include pain, pressure, and a plugged or blocked sensation in the ear(s).   Dizziness:    Mild dizziness that does not interfere with daily activities.   Allergies:    Patient has known seasonal allergies and known dust allergies.    Patient does not think symptoms are allergy-related.   Flu exposure:    No recent known exposure to a person with a confirmed flu diagnosis.    Has not had a flu vaccine this season.   Patient is taking over-the-counter medications for current symptoms, including acetaminophen, dextromethorphan, guaifenesin/dextromethorphan, ibuprofen, and oxymetazoline.   Review of red flags/alarm symptoms:    No changes in alertness or awareness.    No symptoms suggesting intracranial hemorrhage.    No decreased urination.   Risk factors for antibiotic resistance:    Antibiotic use for similar symptoms within the last 30 days.   Self-exam:    Height: 177 centimeters    Weight: 99.7 kilograms    No difficulty moving their chin toward their chest.    Neck lymph nodes feel normal.    Has taken antibiotics for similar symptoms within the past month.   Current medications   Currently taking Vitamin A / Vitamin D and Vitamin A / Vitamin D.   Medication allergies   None.   Medication contraindication review   No history of anaphylactic reaction to beta-lactam antibiotics; aspirin triad; blood dyscrasia; bone marrow depression; catecholamine-releasing paraganglioma; coronary artery disease; coagulation disorder; congenital long QT syndrome; depression; electrolyte  abnormalities; fungal infection; GI bleeding; GI obstruction; G6PD deficiency; heart arrhythmia; hypertension; mononucleosis; myasthenia; recent myocardial infarction; NSAID-induced asthma/urticaria; Parkinson's disease; pheochromocytoma; porphyria; Reye syndrome; seizure disorder; ulcerative colitis; and urinary retention.   No history of metoclopramide-associated dystonic reaction and tardive dyskinesia.   No known history of amoxicillin-clavulanate-associated cholestatic jaundice or hepatic impairment.   No known history of azithromycin-associated cholestatic jaundice or hepatic impairment.   Has taken an MAOI in the previous 2 weeks. Therefore, dextromethorphan, guaifenesin/dextromethorphan, naphazoline-pheniramine, oxymetazoline, acetaminophen-diphenhydramine-phenylephrine, aspirin-chlorpheniramine-phenylephrine, and pseudoephedrine will not be prescribed.   Past medical history   Immune conditions: No immunocompromising conditions. No history of cancer.   Social history   High-risk household contacts: Patient's household includes one or more members of a group with risk factors for influenza complications, including a child younger than 5 years.   Does not smoke tobacco; smoked previously.   Assessment   Bacterial sinusitis. Ruled out: Traumatic laryngitis.   This is the likely diagnosis based on patient's interview responses and symptoms, including:    Congestion or sinus pressure.    Thick nasal discharge.    Yellow or green mucus.    Duration of symptoms longer than 10 days.    Sinus pressure lasting longer than 10 days.   Plan   Medications:    prednisone 20 mg tablet RX 20mg 2 tabs PO qd 5d for asthma exacerbation. Amount is 10 tab.    Mucus Relief  mg tablet, extended release RX 600mg 1 tab PO q12h PRN 7d for cough and congestion. Amount is 14 tab.    ibuprofen 200 mg tablet OTC 200mg 2 tabs PO q8h PRN 10d for any fever, pain, or discomfort associated with your condition. Do not exceed 3200mg in a  24-hour period. Amount is 60 tab.   The patient's prescriptions will be sent to:   [facility] [id number] [facility] [id number]   Phone: [phone]     Fax: [phone]   Patient informed to purchase OTC medication.   Education:    Condition and causes    Prevention    Treatment and self-care    When to call provider   ----------   Electronically signed by VINCENT Honeycutt on 2023-02-02 at 11:53AM   ----------   Patient Interview Transcript:   Please carefully consider each question and answer as best you can. This helps your provider give you the best care. Which of these symptoms are bothering you? Select all that apply.    Cough    Stuffed-up nose or sinuses    Headache    Fatigue or tiredness   Not selected:    Shortness of breath    Fever    Runny nose    Itchy or watery eyes    Itchy nose or sneezing    Loss of smell or taste    Sore throat    Hoarse voice or loss of voice    Sweats    Chills    Muscle or body aches    Nausea or vomiting    Diarrhea    I don't have any of these symptoms   Since your current symptoms started, have you had a viral test for COVID-19? - This includes home self-tests as well as nose swab or saliva tests done at a doctor's office, lab, or testing site. - It does NOT include antibody tests, which use a blood sample to test for past infection. Select one.    Yes   Not selected:    No   When was your most recent COVID-19 test? Select one.    Within the last 24 hours   Not selected:    Within the last week (specify date as MM/DD/YY)    7 to 14 days ago    15 to 30 days ago    More than 1 month ago   What type of COVID-19 test did you most recently have? Select one.    Viral self-test or home test   Not selected:    Viral test at a doctor's office, lab, or testing site   What was the result of your most recent COVID-19 test? Select one.    Negative   Not selected:    Positive   Have you gotten the COVID-19 vaccine? Select one.    Yes   Not selected:    No    "How many total doses of the COVID-19 vaccine have you gotten? This includes boosters as well as additional doses for those who are immunocompromised. Select one.    2 doses   Not selected:    1 dose    3 doses    4 doses    5 doses   1st dose    Pfizer   Not selected:    J&J/Iesha    Moderna    Novavax   2nd dose    Pfizer   Not selected:    J&J/Iesha    Moderna    Novavax   When did you get your most recent dose of the COVID-19 vaccine?    More than 14 days ago   Not selected:    Less than 48 hours (2 days) ago    48 to 72 hours (3 days) ago    3 to 5 days ago    5 to 7 days ago    7 to 14 days ago   In the last 14 days, have you traveled outside of your local community? This includes travel by car, RV, bus, train, or plane. Travel increases your chances of getting and spreading COVID-19. Select one.    No   Not selected:    Yes   In the last 14 days, have you had close contact with someone who has coronavirus (COVID-19)? \"Close contact\" means any of these: - Living in the same household as someone with COVID-19. - Caring for someone with COVID-19. - Being within 6 feet of someone with COVID-19 for a total of at least 15 minutes over a 24-hour period. For example, three 5-minute exposures for a total of 15 minutes. - Being in direct contact with respiratory droplets from someone with COVID-19 (being coughed on, kissing, sharing utensils). Select one.    No, not that I know of   Not selected:    Yes, a confirmed case    Yes, a suspected case   When did your current symptoms start? Select one.    2 to 3 weeks ago   Not selected:    Less than 48 hours ago    3 to 5 days ago    6 to 9 days ago    10 to 14 days ago    3 to 4 weeks ago    More than a month ago   Did your symptoms come on suddenly or gradually? Select one.    Gradually   Not selected:    Suddenly    I'm not sure   Have your symptoms improved at all since they began? Select one.    Yes, but they haven't gone away completely   Not selected:    Yes, but " then they came back worse than before    No    I'm not sure   You mentioned having sweats, chills, aches, fatigue, or headache for two weeks or more. Have you had these symptoms constantly during that time? Select one.    Yes   Not selected:    No   You mentioned having a headache. On a scale of 1 to 10, how severe is your headache pain? Select one.    Severe (7 to 9)   Not selected:    Mild (1 to 3)    Moderate (4 to 6)    Unbearable (10)    The worst headache of my life (10+)   Do you cough so hard that it's made you gag or vomit? By gag, we mean has your coughing made you choke or dry heave? Select all that apply.    Yes, my coughing has made me gag   Not selected:    Yes, my coughing has made me vomit    No   When is your cough the worst? Select all that apply.    At nighttime, or while I'm sleeping   Not selected:    In the morning, or when I wake up    During the day    I haven't noticed a difference depending on time of day   Are you coughing up mucus or phlegm? Select one.    Yes, a lot   Not selected:    No, my cough is dry    Yes, a little   What color is most of the mucus or phlegm that you're coughing up? Select one.    Green   Not selected:    Clear    White/frothy    Yellow    Red or pink    I'm not sure   You mentioned having a stuffy nose or sinus congestion. Do you feel pain or pressure in your sinuses?    Yes   Not selected:    No   Where do you feel sinus pain or pressure?    Around my eyes    Behind my nose    In my upper teeth or jaw   Not selected:    In my forehead    In my cheeks    I'm not sure   When did you first notice your sinus pain or pressure? Select one.    2 to 4 weeks ago   Not selected:    Less than 5 days ago    5 to 9 days ago    10 to 14 days ago    1 month ago or longer   Does coughing, sneezing, or leaning forward make your sinuses feel worse? Select one.    No   Not selected:    Yes   What color is your nasal drainage? Select one.    Green   Not selected:    Clear    White    " Yellow    My nose is stuffed but not draining or running    I'm not sure   Is your nasal drainage thick or thin? Select one.    Thick   Not selected:    Thin   Is there any drainage (mucus) going down the back of your throat? This kind of drainage is also called \"postnasal drip.\" Select one.    Yes   Not selected:    No, not that I know of   Since your symptoms started, have you felt dizzy? Select one.    Yes, but I can continue with my regular daily activities   Not selected:    Yes, and it makes it hard to stand, walk, or do daily activities    No   Do you have chest pain? You might also feel it as discomfort, aching, tightness, or squeezing in the chest. Select one.    No   Not selected:    Yes   Have you urinated at least 3 times in the last 24 hours? Select one.    Yes   Not selected:    No   Changes in alertness or awareness may mean you need emergency care. Since your symptoms started, have you had any of these? Select all that apply.    None of the above   Not selected:    Confusion    Slurred speech    Not knowing where you are or what day it is    Difficulty staying conscious    Fainting or passing out   Do your symptoms include a whistling sound, or wheezing, when you breathe? Select one.    No   Not selected:    Yes    I'm not sure   Do you have any of these symptoms in your ear(s)? Select all that apply.    Pain    Pressure    Plugged or blocked sensation   Not selected:    Fullness    Crackling or popping    None of the above   Can you move your chin toward your chest?    Yes   Not selected:    No, my neck is too stiff   Are your glands/lymph nodes swollen, or does it hurt when you touch them?    No, not that I can tell   Not selected:    Yes   In the past week, has anyone around you (such as at school, work, or home) had a confirmed diagnosis of the flu? A confirmed diagnosis means that a nose swab was done to verify a flu infection. Select all that apply.    No, not that I know of   Not selected:   "  I live with someone who has the flu    I've been within touching distance of someone who has the flu    I've walked by, or sat about 3 feet away from, someone who has the flu    I've been in the same building as someone who has the flu   Have you ever been diagnosed with asthma? Select one.    No   Not selected:    Yes   Have you ever been prescribed albuterol to use for wheezing, cough, or shortness of breath caused by a cold, bronchitis, or pneumonia? Albuterol (ProAir, Proventil, Ventolin) is prescribed as an inhaler or a solution to be used with a nebulizer machine. Select one.    No, not that I know of   Not selected:    Yes   Have you ever been prescribed a steroid inhaler to use for wheezing, cough, or shortness of breath caused by a cold, bronchitis, or pneumonia? Some examples of steroid inhalers include Pulmicort, Flovent, Qvar, and Alvesco. Select one.    No, not that I know of   Not selected:    Yes   Have you ever been diagnosed with chronic obstructive pulmonary disease (COPD)? Select one.    No, not that I know of   Not selected:    Yes   In the past month, have you taken antibiotics for similar symptoms? Examples of antibiotics include amoxicillin, amoxicillin-clavulanate (Augmentin), penicillin, cefdinir (Omnicef), doxycycline, and clindamycin (Cleocin). Select one.    Yes   Not selected:    No   In the last year, how many times were you treated with antibiotics for a sinus infection? Select one.    1 to 3 times   Not selected:    None    4 or more times   Have you been diagnosed with a deviated septum or nasal polyps? The nose is divided into two nostrils by the septum. A crooked septum is called a deviated septum. Nasal polyps are growths inside the nose or sinuses. Select one.    Yes, I have a deviated septum   Not selected:    Yes, but I had surgery to treat them    Yes, I have nasal polyps    Yes, I have a deviated septum and nasal polyps    No, not that I know of   Do you have a sore inside  your nose that won't heal? Select one.    No, not that I know of   Not selected:    Yes   Do you have allergies (pollen, dust mites, mold, animal dander)? Select one.    Yes   Not selected:    No, not that I know of   What kind of allergies do you have? Select all that apply.    Seasonal allergies (hay fever)    Dust allergies   Not selected:    Pet allergies    None of the above    I'm not sure   Do you think your symptoms could be allergy-related? Select one.    No   Not selected:    Yes    I'm not sure   Have you had a flu shot this season? Select one.    No   Not selected:    Yes, less than 2 weeks ago    Yes, 2 to 4 weeks ago    Yes, 1 to 3 months ago    Yes, 3 to 6 months ago    Yes, more than 6 months ago   The flu and COVID-19 can be more serious for people with certain conditions or characteristics. These questions help us figure out if you or anyone you live with is at higher risk for complications from these infections. Do either of these statements apply to you? Select all that apply.    None of the above   Not selected:    I'm  or Native [place]    I'm a healthcare worker   Do you smoke tobacco? Select one.    No, I quit   Not selected:    Yes, every day    Yes, some days    No   Do you have any of these conditions? Select all that apply.    None of the above   Not selected:    Chronic lung disease, such as cystic fibrosis or interstitial fibrosis    Heart disease, such as congenital heart disease, congestive heart failure, or coronary artery disease    Disorder of the brain, spinal cord, or nerves and muscles, such as dementia, cerebral palsy, epilepsy, muscular dystrophy, or developmental delay    Metabolic disorder or mitochondrial disease    Cerebrovascular disease, such as stroke or another condition affecting the blood vessels or blood supply to the brain    Down syndrome    Mood disorder, including depression or schizophrenia spectrum disorders    Substance use disorder, such as  alcohol, opioid, or cocaine use disorder    Tuberculosis   Do you live in a group care setting? Examples include: - Nursing home - Residential care - Psychiatric treatment facility - Group home - Dormitory - Board and care home - Homeless shelter - Foster care setting Select one.    No   Not selected:    Yes   Are you a healthcare worker? Select one.    No   Not selected:    Yes   People with a very high body mass index (BMI) are at higher risk for developing complications from the flu and severe illness from COVID-19. To determine your BMI, we need to know your weight and height. Please enter your weight (in pounds).    Weight   Please enter your height.    Height   Do you have any of these conditions that can affect the immune system? Scroll to see all options. Select all that apply.    None of these   Not selected:    History of bone marrow transplant    Chronic kidney disease    Chronic liver disease (including cirrhosis)    HIV/AIDS    Inflammatory bowel disease (Crohn's disease or ulcerative colitis)    Lupus    Moderate to severe plaque psoriasis    Multiple sclerosis    Rheumatoid arthritis    Sickle cell anemia    Alpha or beta thalassemia    History of solid organ transplant (kidney, liver, or heart)    History of spleen removal    An autoimmune disorder not listed here    A condition requiring treatment with long-term use of oral steroids (such as prednisone, prednisolone, or dexamethasone)   Have you ever been diagnosed with cancer? Select one.    No   Not selected:    Yes, I have cancer now    Yes, but I'm in remission   Do any of these apply to you? Select all that apply.    None of the above   Not selected:    I've been hospitalized within the last 5 days    I have diabetes    I'm in close contact with a child in    Do any of these apply to the people who live with you? Select all that apply.    A child under the age of 5   Not selected:    An adult 65 or older    A person who is pregnant    A  person who has given birth, had a miscarriage, had a pregnancy loss, or had an  in the last 2 weeks    An  or Native [place]    None of the above   Does any member of your household have any of these medical conditions? Select all that apply.    None of the above   Not selected:    Asthma    Disorders of the brain, spinal cord, or nerves and muscles, such as dementia, cerebral palsy, epilepsy, muscular dystrophy, or developmental delay    Chronic lung disease, such as COPD or cystic fibrosis    Heart disease, such as congenital heart disease, congestive heart failure, or coronary artery disease    Cerebrovascular disease, such as stroke or another condition affecting the blood vessels or blood supply to the brain    Blood disorders, such as sickle cell disease    Diabetes    Metabolic disorders such as inherited metabolic disorders or mitochondrial disease    Kidney disorders    Liver disorders    Weakened immune system due to illness or medications such as chemotherapy or steroids    Children under the age of 19 who are on long-term aspirin therapy    Extreme obesity (BMI > 40)   Do you have any of these conditions? Scroll to see all options. Select all that apply.    None of the above   Not selected:    Aspirin triad (also known as Samter's triad or ASA triad)    Asthma or hives from taking aspirin or other NSAIDs, such as ibuprofen or naproxen    Blockage or narrowing of the blood vessels of the heart    Blood clotting disorder    Blood dyscrasia, such anemia, leukemia, lymphoma, or myeloma    Bone marrow depression    Catecholamine-releasing paraganglioma    Congenital long QT syndrome    Depression    Difficulty urinating or completely emptying your bladder    Uncorrected electrolyte abnormalities    Fungal infection    Gastrointestinal (GI) bleeding    Gastrointestinal (GI) obstruction    G6PD deficiency    Recent heart attack    High blood pressure    Irregular heartbeat or heart  rhythm    Mononucleosis (mono)    Myasthenia gravis    Parkinson's disease    Pheochromocytoma    Reye syndrome    Seizure disorder    Thyroid disease    Ulcerative colitis   Have you ever had either of these conditions? Select all that apply.    No   Not selected:    Metoclopramide-associated dystonic reaction    Tardive dyskinesia   Just a few more questions about medications, and then you're finished. Have you used any non-prescription medications or nasal sprays for your current symptoms? Examples include saline sprays, decongestants, NyQuil, and Tylenol. Select one.    Yes   Not selected:    No   Which of these non-prescription medications have you tried? Scroll to see all options. Select all that apply.    Acetaminophen (Tylenol)    Dextromethorphan (Delsym, Robitussin, Vicks DayQuil Cough)    Guaifenesin/dextromethorphan (Delsym DM, Mucinex DM, Robitussin DM)    Ibuprofen (Advil, Motrin, Midol)    Oxymetazoline (Afrin)   Not selected:    Budesonide (Rhinocort)    Cetirizine (Zyrtec)    Chlorpheniramine (Aller-chlor, Chlor-Trimeton)    Cromolyn (NasalCrom)    Diphenhydramine (Benadryl)    Fexofenadine (Allegra)    Fluticasone (Flonase)    Guaifenesin (Mucinex)    Ketotifen (Alaway, Zaditor)    Loratadine (Alavert, Claritin)    Naphazoline-pheniramine (Naphcon-A, Opcon-A, Visine-A)    Omeprazole (Prilosec)    Phenylephrine (Sudafed PE)    Triamcinolone (Nasacort)    None of the above   Have you taken any monoamine oxidase inhibitor (MAOI) medications in the last 14 days? Examples include rasagiline (Azilect), selegiline (Eldepryl, Zelapar), isocarboxazid (Marplan), phenelzine (Nardil), and tranylcypromine (Parnate). Select one.    Yes   Not selected:    No, not that I know of   Do you take Kynmobi or Apokyn (apomorphine)? Select one.    No   Not selected:    Yes   Are you taking any other medications, vitamins, or supplements? Select one.    Yes   Not selected:    No   Have you ever had an allergic or bad  reaction to any medication? Select one.    No   Not selected:    Yes   Are you allergic to milk or to the proteins found in milk (for example, whey or casein)? A milk allergy is different from lactose intolerance. Select one.    No, not that I know of   Not selected:    Yes   Have you ever had jaundice or liver problems as a result of taking amoxicillin-clavulanate (Augmentin)? Jaundice is a condition in which the skin and the whites of the eyes turn yellow. Select all that apply.    No, not that I know of   Not selected:    Yes, jaundice    Yes, liver problems   Have you ever had jaundice or liver problems as a result of taking azithromycin (Zithromax, Zmax)? Jaundice is a condition in which the skin and the whites of the eyes turn yellow. Select all that apply.    No, not that I know of   Not selected:    Yes, jaundice    Yes, liver problems   Do you need a doctor's note? A doctor's note confirms that you received care today and states when you can return to school or work. It does not contain information about your diagnosis or treatment plan. Your provider will make the final decision on whether to give you a doctor's note and for how long. Doctor's notes CANNOT be backdated. We can't provide medical leave paperwork through this type of visit. If more paperwork is needed to request time off, contact your primary care provider. Select one.    No   Not selected:    Today only (1 day)    Today and tomorrow (2 days)    3 days    5 days    7 days    10 days    14 days   Is there anything else you'd like to tell us about your symptoms?    Had this 2-3 weeks ago and never fully recovered   ----------   Medical history   The following information was received from the EMR on February 02, 2023.   Allergies:    LATEX   - Allergy Type: Medication   - Reaction: Rash   - Severity: Mild   - Clinical Status: Active   - Verification Status: Confirmed    ADHESIVE TAPE   - Allergy Type: Biologic   - Reaction: Rash   - Severity:  Mild   - Clinical Status: Active   - Verification Status: Confirmed    TRAMADOL   - Allergy Type: Medication   - Reaction: Nausea And Vomiting   - Severity: None   - Clinical Status: Active   - Verification Status: Confirmed   Problem list:    Hypertension   - Category: Problem List Item   - Health Status:   - Start Date: October 04, 2016   - End Date: None   - Status: Active    Heart palpitations   - Category: Problem List Item   - Health Status:   - Start Date: October 04, 2016   - End Date: None   - Status: Active    Need for immunization against influenza   - Category: Problem List Item   - Health Status:   - Start Date: January 12, 2017   - End Date: None   - Status: Active    Anxiety   - Category: Problem List Item   - Health Status:   - Start Date: May 11, 2017   - End Date: None   - Status: Active    GERD (gastroesophageal reflux disease)   - Category: Problem List Item   - Health Status:   - Start Date: May 11, 2017   - End Date: None   - Status: Active    Hyperlipidemia   - Category: Problem List Item   - Health Status:   - Start Date: November 03, 2017   - End Date: None   - Status: Active    Herpes zoster   - Category: Problem List Item   - Health Status:   - Start Date: December 28, 2017   - End Date: None   - Status: Active    Gas pain   - Category: Problem List Item   - Health Status:   - Start Date: March 26, 2021   - End Date: None   - Status: Active

## 2023-02-10 ENCOUNTER — OFFICE VISIT (OUTPATIENT)
Dept: INTERNAL MEDICINE | Facility: CLINIC | Age: 48
End: 2023-02-10
Payer: COMMERCIAL

## 2023-02-10 ENCOUNTER — HOSPITAL ENCOUNTER (OUTPATIENT)
Dept: GENERAL RADIOLOGY | Facility: HOSPITAL | Age: 48
Discharge: HOME | End: 2023-02-10
Admitting: NURSE PRACTITIONER
Payer: COMMERCIAL

## 2023-02-10 VITALS
SYSTOLIC BLOOD PRESSURE: 122 MMHG | DIASTOLIC BLOOD PRESSURE: 80 MMHG | OXYGEN SATURATION: 97 % | HEART RATE: 74 BPM | WEIGHT: 233.3 LBS | TEMPERATURE: 97.2 F | HEIGHT: 70 IN | BODY MASS INDEX: 33.4 KG/M2

## 2023-02-10 DIAGNOSIS — R07.9 LEFT-SIDED CHEST PAIN: ICD-10-CM

## 2023-02-10 DIAGNOSIS — R10.12 LUQ ABDOMINAL PAIN: Primary | ICD-10-CM

## 2023-02-10 LAB
ALBUMIN SERPL-MCNC: 4.3 G/DL (ref 3.5–5.2)
ALBUMIN/GLOB SERPL: 2 G/DL
ALP SERPL-CCNC: 61 U/L (ref 39–117)
ALT SERPL-CCNC: 21 U/L (ref 1–41)
AMYLASE SERPL-CCNC: 97 U/L (ref 28–100)
AST SERPL-CCNC: 11 U/L (ref 1–40)
BASOPHILS # BLD AUTO: 0.04 10*3/MM3 (ref 0–0.2)
BASOPHILS NFR BLD AUTO: 0.7 % (ref 0–1.5)
BILIRUB SERPL-MCNC: 0.4 MG/DL (ref 0–1.2)
BUN SERPL-MCNC: 16 MG/DL (ref 6–20)
BUN/CREAT SERPL: 11.4 (ref 7–25)
CALCIUM SERPL-MCNC: 9.8 MG/DL (ref 8.6–10.5)
CHLORIDE SERPL-SCNC: 102 MMOL/L (ref 98–107)
CO2 SERPL-SCNC: 33.5 MMOL/L (ref 22–29)
CREAT SERPL-MCNC: 1.4 MG/DL (ref 0.76–1.27)
EGFRCR SERPLBLD CKD-EPI 2021: 63.2 ML/MIN/1.73
EOSINOPHIL # BLD AUTO: 0.13 10*3/MM3 (ref 0–0.4)
EOSINOPHIL NFR BLD AUTO: 2.1 % (ref 0.3–6.2)
ERYTHROCYTE [DISTWIDTH] IN BLOOD BY AUTOMATED COUNT: 11.7 % (ref 12.3–15.4)
GLOBULIN SER CALC-MCNC: 2.2 GM/DL
GLUCOSE SERPL-MCNC: 87 MG/DL (ref 65–99)
HCT VFR BLD AUTO: 49 % (ref 37.5–51)
HGB BLD-MCNC: 17 G/DL (ref 13–17.7)
IMM GRANULOCYTES # BLD AUTO: 0.01 10*3/MM3 (ref 0–0.05)
IMM GRANULOCYTES NFR BLD AUTO: 0.2 % (ref 0–0.5)
LIPASE SERPL-CCNC: 35 U/L (ref 13–60)
LYMPHOCYTES # BLD AUTO: 2.24 10*3/MM3 (ref 0.7–3.1)
LYMPHOCYTES NFR BLD AUTO: 37 % (ref 19.6–45.3)
MCH RBC QN AUTO: 29.7 PG (ref 26.6–33)
MCHC RBC AUTO-ENTMCNC: 34.7 G/DL (ref 31.5–35.7)
MCV RBC AUTO: 85.5 FL (ref 79–97)
MONOCYTES # BLD AUTO: 0.54 10*3/MM3 (ref 0.1–0.9)
MONOCYTES NFR BLD AUTO: 8.9 % (ref 5–12)
NEUTROPHILS # BLD AUTO: 3.1 10*3/MM3 (ref 1.7–7)
NEUTROPHILS NFR BLD AUTO: 51.1 % (ref 42.7–76)
NRBC BLD AUTO-RTO: 0 /100 WBC (ref 0–0.2)
PLATELET # BLD AUTO: 258 10*3/MM3 (ref 140–450)
POTASSIUM SERPL-SCNC: 4.4 MMOL/L (ref 3.5–5.2)
PROT SERPL-MCNC: 6.5 G/DL (ref 6–8.5)
RBC # BLD AUTO: 5.73 10*6/MM3 (ref 4.14–5.8)
SODIUM SERPL-SCNC: 141 MMOL/L (ref 136–145)
WBC # BLD AUTO: 6.06 10*3/MM3 (ref 3.4–10.8)

## 2023-02-10 PROCEDURE — 93000 ELECTROCARDIOGRAM COMPLETE: CPT | Performed by: NURSE PRACTITIONER

## 2023-02-10 PROCEDURE — 71046 X-RAY EXAM CHEST 2 VIEWS: CPT

## 2023-02-10 PROCEDURE — 99214 OFFICE O/P EST MOD 30 MIN: CPT | Performed by: NURSE PRACTITIONER

## 2023-02-10 RX ORDER — VENLAFAXINE HYDROCHLORIDE 75 MG/1
CAPSULE, EXTENDED RELEASE ORAL
COMMUNITY
Start: 2023-02-09

## 2023-02-10 RX ORDER — CLONAZEPAM 0.5 MG/1
TABLET ORAL
COMMUNITY
Start: 2023-02-09

## 2023-02-10 NOTE — PROGRESS NOTES
Subjective   Colby Nicole is a 47 y.o. male. Patient is here today for   Chief Complaint   Patient presents with   • Chest Pain     Patient to complains of chest pain for a couple of months    .    History of Present Illness   C/o left lower chest pain for a couple of months. Feels like a deep pain. Comes and goes. Not a sharp pain.   No problems with bowels. Denies shortness of breath (can exercise with no problems).   No cough.     The following portions of the patient's history were reviewed and updated as appropriate: allergies, current medications, past family history, past medical history, past social history, past surgical history and problem list.    Review of Systems    Objective   Vitals:    02/10/23 0949   BP: 122/80   Pulse: 74   Temp: 97.2 °F (36.2 °C)   SpO2: 97%     Body mass index is 33.48 kg/m².  Physical Exam  Vitals and nursing note reviewed.   Constitutional:       Appearance: Normal appearance. He is well-developed.   Cardiovascular:      Rate and Rhythm: Normal rate and regular rhythm.      Heart sounds: Normal heart sounds.   Pulmonary:      Effort: Pulmonary effort is normal.      Breath sounds: Normal breath sounds.   Chest:          Comments: Area of pain  Skin:     General: Skin is warm and dry.   Neurological:      Mental Status: He is alert and oriented to person, place, and time.   Psychiatric:         Speech: Speech normal.         Behavior: Behavior normal.         Thought Content: Thought content normal.           ECG 12 Lead    Date/Time: 2/10/2023 9:44 AM  Performed by: Sally Conde APRN  Authorized by: Sally Conde APRN   Comparison: compared with previous ECG from 6/3/2021  Similar to previous ECG  Rhythm: sinus rhythm  Rate: normal  BPM: 74  Conduction: conduction normal  ST Segments: ST segments normal  T Waves: T waves normal  QRS axis: normal  Other: no other findings    Clinical impression: normal ECG  Comments: DE interval 160 ms  QRS interval 110 ms  QTc  interval 384 ms              Assessment & Plan   Diagnoses and all orders for this visit:    1. LUQ abdominal pain (Primary)  -     Amylase  -     CBC Auto Differential  -     Comprehensive Metabolic Panel  -     Lipase    2. Left-sided chest pain  -     XR Chest PA & Lateral; Future  -     ECG 12 Lead    Other orders  -     CBC & Differential    EKG normal. Will check labs to evaluate pancreas. This may be a muscle strain. F/u based on results above    Patient's chest x-ray is normal. He was notified of results

## 2023-02-13 DIAGNOSIS — R07.9 LEFT-SIDED CHEST PAIN: Primary | ICD-10-CM

## 2023-03-29 ENCOUNTER — TELEMEDICINE (OUTPATIENT)
Dept: FAMILY MEDICINE CLINIC | Facility: TELEHEALTH | Age: 48
End: 2023-03-29
Payer: COMMERCIAL

## 2023-03-29 VITALS — WEIGHT: 233 LBS | BODY MASS INDEX: 33.36 KG/M2 | TEMPERATURE: 101.5 F | HEIGHT: 70 IN

## 2023-03-29 DIAGNOSIS — B34.9 VIRAL ILLNESS: Primary | ICD-10-CM

## 2023-03-29 PROBLEM — J32.9 BACTERIAL SINUSITIS: Status: ACTIVE | Noted: 2023-03-29

## 2023-03-29 PROBLEM — B96.89 BACTERIAL SINUSITIS: Status: ACTIVE | Noted: 2023-03-29

## 2023-03-29 PROCEDURE — 99213 OFFICE O/P EST LOW 20 MIN: CPT | Performed by: NURSE PRACTITIONER

## 2023-03-29 RX ORDER — PREDNISONE 20 MG/1
20 TABLET ORAL 2 TIMES DAILY
Qty: 10 TABLET | Refills: 0 | Status: SHIPPED | OUTPATIENT
Start: 2023-03-29 | End: 2023-04-03

## 2023-03-29 RX ORDER — BROMPHENIRAMINE MALEATE, PSEUDOEPHEDRINE HYDROCHLORIDE, AND DEXTROMETHORPHAN HYDROBROMIDE 2; 30; 10 MG/5ML; MG/5ML; MG/5ML
SYRUP ORAL
Qty: 240 ML | Refills: 0 | Status: SHIPPED | OUTPATIENT
Start: 2023-03-29

## 2023-03-29 NOTE — PROGRESS NOTES
You have chosen to receive care through a telehealth visit.  Do you consent to use a video/audio connection for your medical care today? Yes     CHIEF COMPLAINT  Cc: ear pain, throat pain, fever    HPI  Colby Nicole is a 48 y.o. male  presents with complaint of ear pain, throat pain and fever. He reports fever, chills, green nasal congestion and that his eyes feel tired. Additional symptoms that he is having are noted in the ROS portion of this visit. He has had his symptoms for 2-3 days. He reports that his wife has like symptoms and is going to the doctor this afternoon. He did do a home COVID test test and the result of that test were negative.     Review of Systems   Constitutional: Positive for chills, fatigue and fever.   HENT: Positive for congestion, ear pain (right), postnasal drip, rhinorrhea, sinus pressure, sinus pain, sneezing (mild) and sore throat.    Eyes:        Feel dry and tired   Respiratory: Positive for cough. Negative for chest tightness, shortness of breath and wheezing.    Cardiovascular: Negative for chest pain.   Gastrointestinal: Positive for nausea. Negative for diarrhea.   Musculoskeletal: Positive for myalgias.   Neurological: Positive for headaches.       Past Medical History:   Diagnosis Date   • Anxiety    • Basal cell carcinoma 09/24/2020    left temple; MOHS surgery   • GERD (gastroesophageal reflux disease)    • Heart palpitations    • Hypertension        Family History   Problem Relation Age of Onset   • No Known Problems Mother    • No Known Problems Father        Social History     Socioeconomic History   • Marital status: Single   Tobacco Use   • Smoking status: Former     Packs/day: 0.50     Years: 21.00     Pack years: 10.50     Types: Cigarettes   • Smokeless tobacco: Former   • Tobacco comments:     QUIT 12/2017   Vaping Use   • Vaping Use: Never used   Substance and Sexual Activity   • Alcohol use: Yes     Alcohol/week: 6.0 standard drinks     Types: 6 Cans of beer  "per week     Comment: occasionally    • Drug use: No   • Sexual activity: Defer       Colby Nicole  reports that he has quit smoking. His smoking use included cigarettes. He has a 10.50 pack-year smoking history. He has quit using smokeless tobacco..    Temp (!) 101.5 °F (38.6 °C)   Ht 177.8 cm (70\")   Wt 106 kg (233 lb)   BMI 33.43 kg/m²     PHYSICAL EXAM  Physical Exam   Constitutional: He is oriented to person, place, and time. He appears well-developed and well-nourished.   HENT:   Head: Normocephalic and atraumatic.   Right Ear: External ear normal.   Left Ear: External ear normal.   Nose: Congestion present. Right sinus exhibits maxillary sinus tenderness (patient direced exam) and frontal sinus tenderness (patient direced exam). Left sinus exhibits maxillary sinus tenderness (patient direced exam) and frontal sinus tenderness (patient direced exam).   Eyes: Lids are normal. Right eye exhibits no discharge and no exudate. Left eye exhibits no discharge and no exudate. Right conjunctiva is not injected. Left conjunctiva is not injected.   Pulmonary/Chest: No accessory muscle usage. No tachypnea and no bradypnea.  No respiratory distress (cough noted at visit).No use of oxygen by nasal cannulaNo use of oxygen by mask noted.  Abdominal: Abdomen appears normal.   Neurological: He is alert and oriented to person, place, and time. No cranial nerve deficit.   Skin: His skin appears normal.  Psychiatric: He has a normal mood and affect. His speech is normal and behavior is normal. Judgment and thought content normal.       Results for orders placed or performed in visit on 02/10/23   Amylase    Specimen: Blood   Result Value Ref Range    Amylase 97 28 - 100 U/L   Comprehensive Metabolic Panel    Specimen: Blood   Result Value Ref Range    Glucose 87 65 - 99 mg/dL    BUN 16 6 - 20 mg/dL    Creatinine 1.40 (H) 0.76 - 1.27 mg/dL    EGFR Result 63.2 >60.0 mL/min/1.73    BUN/Creatinine Ratio 11.4 7.0 - 25.0    " Sodium 141 136 - 145 mmol/L    Potassium 4.4 3.5 - 5.2 mmol/L    Chloride 102 98 - 107 mmol/L    Total CO2 33.5 (H) 22.0 - 29.0 mmol/L    Calcium 9.8 8.6 - 10.5 mg/dL    Total Protein 6.5 6.0 - 8.5 g/dL    Albumin 4.3 3.5 - 5.2 g/dL    Globulin 2.2 gm/dL    A/G Ratio 2.0 g/dL    Total Bilirubin 0.4 0.0 - 1.2 mg/dL    Alkaline Phosphatase 61 39 - 117 U/L    AST (SGOT) 11 1 - 40 U/L    ALT (SGPT) 21 1 - 41 U/L   Lipase    Specimen: Blood   Result Value Ref Range    Lipase 35 13 - 60 U/L   CBC & Differential   Result Value Ref Range    WBC 6.06 3.40 - 10.80 10*3/mm3    RBC 5.73 4.14 - 5.80 10*6/mm3    Hemoglobin 17.0 13.0 - 17.7 g/dL    Hematocrit 49.0 37.5 - 51.0 %    MCV 85.5 79.0 - 97.0 fL    MCH 29.7 26.6 - 33.0 pg    MCHC 34.7 31.5 - 35.7 g/dL    RDW 11.7 (L) 12.3 - 15.4 %    Platelets 258 140 - 450 10*3/mm3    Neutrophil Rel % 51.1 42.7 - 76.0 %    Lymphocyte Rel % 37.0 19.6 - 45.3 %    Monocyte Rel % 8.9 5.0 - 12.0 %    Eosinophil Rel % 2.1 0.3 - 6.2 %    Basophil Rel % 0.7 0.0 - 1.5 %    Neutrophils Absolute 3.10 1.70 - 7.00 10*3/mm3    Lymphocytes Absolute 2.24 0.70 - 3.10 10*3/mm3    Monocytes Absolute 0.54 0.10 - 0.90 10*3/mm3    Eosinophils Absolute 0.13 0.00 - 0.40 10*3/mm3    Basophils Absolute 0.04 0.00 - 0.20 10*3/mm3    Immature Granulocyte Rel % 0.2 0.0 - 0.5 %    Immature Grans Absolute 0.01 0.00 - 0.05 10*3/mm3    nRBC 0.0 0.0 - 0.2 /100 WBC       Diagnoses and all orders for this visit:    1. Viral illness (Primary)    Other orders  -     brompheniramine-pseudoephedrine-DM (Bromfed DM) 30-2-10 MG/5ML syrup; 5 to 10 cc every 4 hours as needed for cough, congestion, allergies  Dispense: 240 mL; Refill: 0  -     predniSONE (DELTASONE) 20 MG tablet; Take 1 tablet by mouth 2 (Two) Times a Day for 5 days.  Dispense: 10 tablet; Refill: 0    Bromfed as need for cough congestion and allergies  May continue Mucinex with plenty of fluids especially water to thin secretions and help with congestion.  May  alternate tylenol and ibuprofen  Hydrate well  Repeat COVID test advised  Message me or this service if wife is tested for strep, COVID or flu with positive results    FOLLOW-UP  If symptoms worsen or persist follow up with PCP.Virtual Care or Urgent Care    Patient verbalizes understanding of medication dosage, comfort measures, instructions for treatment and follow-up.    Bhumi Ribera, APRN  03/29/2023  09:51 EDT    The use of a video visit has been reviewed with the patient and verbal informed consent has been obtained. Myself and Colby Nicole participated in this visit. The patient is located in [address] [place] [address] I am located in [place], [place]. [employer] and [employer] were utilized. I spent 25 minutes in the patient's chart for this visit.    Addendum: Patient's wife with like symptoms was seen in office and tested for strep, flu and COVID.her results were all negative. The patient is requesting prednsions. He reports that it really helps with his sinus pressure nd breathing. Prednisone ordered for this patient.  Take prednisone with food as early in the day as possible  Do not take prednisone with nsaids such as ibuprofen, aleve, or aspirin  May take tylenol for pain or fever

## 2023-09-13 NOTE — PROGRESS NOTES
Stable exam  Colonoscopy August 2023 stable  Influenza vaccine  Fall 2023  [facility]  COVID booster fall 2023  Tdap  Done age 62  Mammogram  DEXA scan  Lab work   You have chosen to receive care through a telehealth visit.  Do you consent to use a video/audio connection for your medical care today? Yes     CHIEF COMPLAINT  Cc: sinus problems    HPI  Colby Nicole is a 46 y.o. male  presents with complaint of sinus problems. Symptoms include; mild fatigue, green nasal congestion, bilateral ear pain>.right, post nasal drainage, maxillary sinus pain and pressure and a sore throat. He reports that he just had COVID a few weeks ago. He has done a COVID-19 test and the results were negative. He has no known COVID exposure. He has taken advil cold and sinus for his symptoms.    Review of Systems   Constitutional: Positive for fatigue (mild). Negative for fever.   HENT: Positive for congestion (green), ear pain (>right), postnasal drip, sinus pressure (maxillary), sinus pain (maxillary) and sore throat.    Respiratory: Negative for cough, chest tightness, shortness of breath and wheezing.    Cardiovascular: Negative for chest pain.   Gastrointestinal: Negative for diarrhea, nausea and vomiting.   Musculoskeletal: Negative for myalgias.   Neurological: Negative for headaches.       Past Medical History:   Diagnosis Date   • Anxiety    • Basal cell carcinoma 09/24/2020    left temple; MOHS surgery   • GERD (gastroesophageal reflux disease)    • Heart palpitations    • Hypertension        Family History   Problem Relation Age of Onset   • No Known Problems Mother    • No Known Problems Father        Social History     Socioeconomic History   • Marital status: Single   Tobacco Use   • Smoking status: Former Smoker     Packs/day: 0.50     Years: 21.00     Pack years: 10.50     Types: Cigarettes   • Smokeless tobacco: Former User   • Tobacco comment: QUIT 12/2017   Vaping Use   • Vaping Use: Never used   Substance and Sexual Activity   • Alcohol use: Yes     Alcohol/week: 6.0 standard drinks     Types: 6 Cans of beer per week     Comment: occasionally    • Drug use: No   • Sexual  "activity: Defer         Temp 98.3 °F (36.8 °C)   Ht 180.3 cm (71\")   Wt 102 kg (225 lb)   BMI 31.38 kg/m²     PHYSICAL EXAM  Physical Exam   Constitutional: He is oriented to person, place, and time. He appears well-developed and well-nourished.   HENT:   Head: Normocephalic and atraumatic.   Right Ear: There is tenderness.   Left Ear: There is tenderness.   Nose: Congestion present. Right sinus exhibits maxillary sinus tenderness (patient directed exam). Left sinus exhibits maxillary sinus tenderness (patient directed exam).   Mouth/Throat: Mucous membranes are erythematous.   Eyes: Lids are normal. Right eye exhibits no discharge and no exudate. Left eye exhibits no discharge and no exudate. Right conjunctiva is not injected. Left conjunctiva is not injected.   Pulmonary/Chest: No accessory muscle usage. No tachypnea and no bradypnea.  No respiratory distress.No use of oxygen by nasal cannulaNo use of oxygen by mask noted.  Abdominal: Abdomen appears normal.   Neurological: He is alert and oriented to person, place, and time. No cranial nerve deficit.   Skin: His skin appears normal.  Psychiatric: He has a normal mood and affect. His speech is normal and behavior is normal. Judgment and thought content normal.       Results for orders placed or performed in visit on 07/12/21   Comprehensive Metabolic Panel    Specimen: Blood   Result Value Ref Range    Glucose 79 65 - 99 mg/dL    BUN 17 6 - 20 mg/dL    Creatinine 1.32 (H) 0.76 - 1.27 mg/dL    eGFR Non African Am 59 (L) >60 mL/min/1.73    eGFR African Am 71 >60 mL/min/1.73    BUN/Creatinine Ratio 12.9 7.0 - 25.0    Sodium 144 136 - 145 mmol/L    Potassium 4.8 3.5 - 5.2 mmol/L    Chloride 103 98 - 107 mmol/L    Total CO2 26.5 22.0 - 29.0 mmol/L    Calcium 9.9 8.6 - 10.5 mg/dL    Total Protein 6.5 6.0 - 8.5 g/dL    Albumin 4.60 3.50 - 5.20 g/dL    Globulin 1.9 gm/dL    A/G Ratio 2.4 g/dL    Total Bilirubin 0.8 0.0 - 1.2 mg/dL    Alkaline Phosphatase 53 39 - 117 " U/L    AST (SGOT) 21 1 - 40 U/L    ALT (SGPT) 27 1 - 41 U/L   Lipid Panel With / Chol / HDL Ratio    Specimen: Blood   Result Value Ref Range    Total Cholesterol 226 (H) 0 - 200 mg/dL    Triglycerides 74 0 - 150 mg/dL    HDL Cholesterol 52 40 - 60 mg/dL    VLDL Cholesterol Ayush 13 5 - 40 mg/dL    LDL Chol Calc (NIH) 161 (H) 0 - 100 mg/dL    Chol/HDL Ratio 4.35    Testosterone, Free, Total    Specimen: Blood   Result Value Ref Range    Testosterone, Total 489 264 - 916 ng/dL    Testosterone, Free 9.3 6.8 - 21.5 pg/mL       Diagnoses and all orders for this visit:    1. Maxillary sinusitis, unspecified chronicity (Primary)    Other orders  -     amoxicillin (AMOXIL) 875 MG tablet; Take 1 tablet by mouth 2 (Two) Times a Day for 10 days.  Dispense: 20 tablet; Refill: 0  -     guaiFENesin (Mucinex) 600 MG 12 hr tablet; Take 1 tablet by mouth 2 (Two) Times a Day for 14 days.  Dispense: 28 tablet; Refill: 0  -     predniSONE (DELTASONE) 20 MG tablet; Take 1 tablet by mouth 2 (Two) Times a Day for 7 days.  Dispense: 14 tablet; Refill: 0    Probiotics for two weeks related to taking antibiotics. The pharmacist can help you with this if needed. Do not take within two hours of antibiotic.  Take prednisone with food and not to close to bedtime as itmay keep you awake at night   Do not take prednisone with nsaids such as ibuprofen, aleve, or aspirin  May take tylenol as needed for pain or fever  Mucinex with plenty of fluids especially water to thin secretions and help with congestion.    FOLLOW-UP  If symptoms worsen or persist follow up with PCP/Virtual Care or Urgent Care    Patient verbalizes understanding of medication dosage, comfort measures, instructions for treatment and follow-up.    Bhumi Ribera, APRN  12/31/2021  15:27 EST    This visit was performed via Telehealth.  This patient has been instructed to follow-up with their primary care provider if their symptoms worsen or the treatment provided does not resolve  their illness.

## 2023-10-02 ENCOUNTER — OFFICE VISIT (OUTPATIENT)
Dept: INTERNAL MEDICINE | Facility: CLINIC | Age: 48
End: 2023-10-02

## 2023-10-02 VITALS
SYSTOLIC BLOOD PRESSURE: 120 MMHG | WEIGHT: 234.4 LBS | DIASTOLIC BLOOD PRESSURE: 82 MMHG | BODY MASS INDEX: 33.56 KG/M2 | HEIGHT: 70 IN | TEMPERATURE: 98 F | OXYGEN SATURATION: 98 % | HEART RATE: 74 BPM

## 2023-10-02 DIAGNOSIS — R19.7 DIARRHEA, UNSPECIFIED TYPE: Primary | ICD-10-CM

## 2023-10-02 DIAGNOSIS — K64.9 HEMORRHOIDS, UNSPECIFIED HEMORRHOID TYPE: ICD-10-CM

## 2023-10-02 DIAGNOSIS — J06.9 UPPER RESPIRATORY TRACT INFECTION, UNSPECIFIED TYPE: ICD-10-CM

## 2023-10-02 PROCEDURE — 99214 OFFICE O/P EST MOD 30 MIN: CPT | Performed by: NURSE PRACTITIONER

## 2023-10-02 RX ORDER — HYDROCORTISONE ACETATE 25 MG/1
25 SUPPOSITORY RECTAL 2 TIMES DAILY
Qty: 24 EACH | Refills: 0 | Status: SHIPPED | OUTPATIENT
Start: 2023-10-02

## 2023-10-02 NOTE — PROGRESS NOTES
Subjective   Colby Nicole is a 48 y.o. male. Patient is here today for   Chief Complaint   Patient presents with    Black or Bloody Stool   .    History of Present Illness   C/o bloody stool yesterday associated with loose stools. He took a hydrocortisone suppository which did help. He recently traveled.   Diarrhea x 3 yesterday and today 2. Last stool with no blood. He does have a hemorrhoid.   C/o some sinus pressure that started today associated with some nasal congestion. He took Ibuprofen 800 mg this morning which helped some.    The following portions of the patient's history were reviewed and updated as appropriate: allergies, current medications, past family history, past medical history, past social history, past surgical history and problem list.    Review of Systems    Objective   Vitals:    10/02/23 1515   BP: 120/82   Pulse: 74   Temp: 98 °F (36.7 °C)   SpO2: 98%     Body mass index is 33.63 kg/m².  Physical Exam  Vitals and nursing note reviewed.   Constitutional:       Appearance: Normal appearance. He is well-developed.   HENT:      Right Ear: Ear canal normal. A middle ear effusion is present.      Left Ear: Ear canal normal. A middle ear effusion is present.      Mouth/Throat:      Pharynx: Oropharynx is clear.   Cardiovascular:      Rate and Rhythm: Normal rate and regular rhythm.      Heart sounds: Normal heart sounds.   Pulmonary:      Effort: Pulmonary effort is normal.      Breath sounds: Normal breath sounds.   Abdominal:      Tenderness: There is no abdominal tenderness.   Skin:     General: Skin is warm and dry.   Neurological:      Mental Status: He is alert and oriented to person, place, and time.   Psychiatric:         Speech: Speech normal.         Behavior: Behavior normal.         Thought Content: Thought content normal.       Assessment & Plan   Diagnoses and all orders for this visit:    1. Diarrhea, unspecified type (Primary)  -     CBC Auto Differential  -     Comprehensive  Metabolic Panel    2. Hemorrhoids, unspecified hemorrhoid type  -     hydrocortisone (ANUSOL-HC) 25 MG suppository; Insert 1 suppository into the rectum 2 (Two) Times a Day.  Dispense: 24 each; Refill: 0    3. Upper respiratory tract infection, unspecified type      Diarrhea - will check the above labs.  This is likely viral and will improve with time.  If it does not improve, patient will follow-up.    Hemorrhoids -refill sent in of hydrocortisone suppositories.  If patient has more episodes of blood in his stool, will follow-up with GI    URI -this is likely viral.  Patient will call if his symptoms worsen or do not improve

## 2024-02-06 ENCOUNTER — TELEPHONE (OUTPATIENT)
Dept: INTERNAL MEDICINE | Facility: CLINIC | Age: 49
End: 2024-02-06

## 2024-02-06 NOTE — TELEPHONE ENCOUNTER
----- Message from Janae Valerio MA sent at 2/6/2024  7:02 AM EST -----  Can we get patient scheduled for physical with fasting labs prior please ?

## 2024-02-12 PROBLEM — K21.9 ACID REFLUX: Status: ACTIVE | Noted: 2024-02-12

## 2024-04-09 DIAGNOSIS — K64.9 HEMORRHOIDS, UNSPECIFIED HEMORRHOID TYPE: ICD-10-CM

## 2024-04-12 RX ORDER — HYDROCORTISONE ACETATE 25 MG
SUPPOSITORY, RECTAL RECTAL
Qty: 24 EACH | Refills: 0 | OUTPATIENT
Start: 2024-04-12

## 2025-01-20 ENCOUNTER — TELEMEDICINE (OUTPATIENT)
Dept: FAMILY MEDICINE CLINIC | Facility: TELEHEALTH | Age: 50
End: 2025-01-20
Payer: COMMERCIAL

## 2025-01-20 DIAGNOSIS — Z20.818 STREP THROAT EXPOSURE: ICD-10-CM

## 2025-01-20 DIAGNOSIS — J02.9 EXUDATIVE PHARYNGITIS: Primary | ICD-10-CM

## 2025-01-20 PROCEDURE — 99213 OFFICE O/P EST LOW 20 MIN: CPT | Performed by: NURSE PRACTITIONER

## 2025-01-20 RX ORDER — AMOXICILLIN 875 MG/1
875 TABLET, COATED ORAL 2 TIMES DAILY
Qty: 14 TABLET | Refills: 0 | Status: SHIPPED | OUTPATIENT
Start: 2025-01-20 | End: 2025-01-27

## 2025-01-20 NOTE — PROGRESS NOTES
You have chosen to receive care through a telehealth visit.  Do you consent to use a video/audio connection for your medical care today? Yes     HPI  History of Present Illness  The patient is a 49-year-old male who presents via virtual visit today with a sore throat and ear pain.    He has been experiencing symptoms for the past 2 days, which include bilateral earache, sore throat, headache, mild congestion, and a slight cough. He reports the presence of white blisters on the posterior aspect of his throat. He also experienced severe chills last night, necessitating 16 hours of sleep. He reports no gastrointestinal symptoms. He has a history of strep throat, having contracted it once in the past. He has been exposed to strep throat at home. He has been managing his pain with ibuprofen.    ALLERGIES  The patient is allergic to TRAMADOL and LATEX.    MEDICATIONS  Ibuprofen.    Review of Systems   Constitutional:  Positive for chills, fatigue and fever.   HENT:  Positive for ear pain and sore throat.    Gastrointestinal: Negative.    Neurological:  Positive for headaches.   Hematological:  Positive for adenopathy.   Psychiatric/Behavioral: Negative.         Past Medical History:   Diagnosis Date    Anxiety     Basal cell carcinoma 09/24/2020    left temple; MOHS surgery    GERD (gastroesophageal reflux disease)     Heart palpitations     Hypertension        Family History   Problem Relation Age of Onset    No Known Problems Mother     No Known Problems Father        Social History     Socioeconomic History    Marital status: Single   Tobacco Use    Smoking status: Former     Current packs/day: 0.50     Average packs/day: 0.5 packs/day for 21.0 years (10.5 ttl pk-yrs)     Types: Cigarettes    Smokeless tobacco: Former    Tobacco comments:     QUIT 12/2017   Vaping Use    Vaping status: Never Used   Substance and Sexual Activity    Alcohol use: Yes     Alcohol/week: 6.0 standard drinks of alcohol     Types: 6 Cans of  beer per week     Comment: occasionally     Drug use: No    Sexual activity: Defer         There were no vitals taken for this visit.    PHYSICAL EXAM  Physical Exam   Constitutional: He is oriented to person, place, and time. He appears well-developed and well-nourished. He has a sickly appearance. He appears ill. No distress.   HENT:   Head: Normocephalic and atraumatic.   Right Ear: Hearing normal.   Left Ear: Hearing normal.   Nose: Nose normal.   Mouth/Throat: Mouth/Lips are normal.Mucous membranes are erythematous. Oropharyngeal exudate present. white patches.  Pulmonary/Chest: Effort normal.  No respiratory distress. He no audible wheeze...  Lymphadenopathy:     He has cervical adenopathy.   Neurological: He is alert and oriented to person, place, and time.   Psychiatric: He has a normal mood and affect.   Vitals reviewed.    Physical Exam      Diagnoses and all orders for this visit:    1. Exudative pharyngitis (Primary)  -     amoxicillin (AMOXIL) 875 MG tablet; Take 1 tablet by mouth 2 (Two) Times a Day for 7 days.  Dispense: 14 tablet; Refill: 0    2. Strep throat exposure  -     amoxicillin (AMOXIL) 875 MG tablet; Take 1 tablet by mouth 2 (Two) Times a Day for 7 days.  Dispense: 14 tablet; Refill: 0      Assessment & Plan  1. Strep throat.  He reports a sore throat, headache, and white blisters on the back of his throat, along with chills and a likely fever. He has been exposed to strep throat at home. Amoxicillin has been prescribed and sent to [facility] pharmacy in [place] on [facility]. He is advised to start the antibiotic immediately. After a couple of days of therapy, he should discard his current toothbrush and replace it with a new one. Symptomatic relief measures include warm salt water gargling, warm tea with lemon and honey, and ibuprofen 600 mg every 6 hours with food, which can be alternated with Tylenol. Mucinex DM or Robitussin is recommended for cough management. Improvement is  expected within 24 hours of starting the antibiotic.      FOLLOW-UP  As discussed during visit with [facility], if symptoms worsen or fail to improve, follow-up with PCP/Urgent Care/Emergency Department.    Patient verbalizes understanding of medications, instructions for treatment and follow-up.    Patient or patient representative verbalized consent for the use of Ambient Listening during the visit with  VINCENT Lockhart for chart documentation. 1/20/2025  08:59 EST    VINCENT Lockhart  01/20/2025  08:59 EST    The use of a video visit has been reviewed with the patient and verbal informed consent has been obtained. Myself and Colby Nicole participated in this visit. The patient is located in [facility], and I am located in [place], [place]. [employer] and [employer]  were utilized.

## 2025-04-14 ENCOUNTER — TELEMEDICINE (OUTPATIENT)
Dept: FAMILY MEDICINE CLINIC | Facility: TELEHEALTH | Age: 50
End: 2025-04-14
Payer: COMMERCIAL

## 2025-04-14 DIAGNOSIS — J01.00 ACUTE MAXILLARY SINUSITIS, RECURRENCE NOT SPECIFIED: Primary | ICD-10-CM

## 2025-04-14 PROCEDURE — 99213 OFFICE O/P EST LOW 20 MIN: CPT | Performed by: NURSE PRACTITIONER

## 2025-04-14 RX ORDER — PREDNISONE 20 MG/1
20 TABLET ORAL 2 TIMES DAILY
Qty: 10 TABLET | Refills: 0 | Status: SHIPPED | OUTPATIENT
Start: 2025-04-14 | End: 2025-04-19

## 2025-04-14 NOTE — PROGRESS NOTES
You have chosen to receive care through a telehealth visit.  Do you consent to use a video/audio connection for your medical care today? Yes     HPI  History of Present Illness  The patient is a 50-year-old male who presents via virtual visit for evaluation of left-sided facial pain with significant mucus production, burning sensation in the nasal passages, and irritation and burning in the eyes.    He has been experiencing congestion for approximately 2 to 3 days, accompanied by facial pain that started yesterday and has persisted into today. He reports feeling weak yesterday, necessitating a 2-hour nap, and today he is experiencing body aches. He believes these symptoms are indicative of a sinus infection that is progressively worsening. He does not report any fevers but suspects the presence of a low-grade fever. He is a non-smoker. He recalls that the steroid was particularly effective in alleviating his nasal congestion during his previous treatment. He has been self-medicating with Sudafed for the past 3 days. He was previously treated with Augmentin, prednisone, and Sudafed in 02/2025 for an ear infection.    SOCIAL HISTORY  He does not smoke.    ALLERGIES  He has had an allergic reaction to TRAMADOL, ADHESIVE TAPE, and LATEX.    MEDICATIONS  Current: Klonopin, Anusol, Effexor, Sudafed    Review of Systems: See HPI    Past Medical History:   Diagnosis Date    Anxiety     Basal cell carcinoma 09/24/2020    left temple; MOHS surgery    Diverticulosis     GERD (gastroesophageal reflux disease)     Heart palpitations     Hypertension        Family History   Problem Relation Age of Onset    No Known Problems Mother     No Known Problems Father        Social History     Socioeconomic History    Marital status: Single   Tobacco Use    Smoking status: Former     Current packs/day: 0.50     Average packs/day: 0.5 packs/day for 21.0 years (10.5 ttl pk-yrs)     Types: Cigarettes    Smokeless tobacco: Former    Tobacco  comments:     QUIT 12/2017   Vaping Use    Vaping status: Never Used   Substance and Sexual Activity    Alcohol use: Yes     Alcohol/week: 6.0 standard drinks of alcohol     Types: 6 Cans of beer per week     Comment: occasionally     Drug use: No    Sexual activity: Defer         There were no vitals taken for this visit.    PHYSICAL EXAM  Physical Exam   Constitutional: He is oriented to person, place, and time. He appears well-developed and well-nourished. He does not have a sickly appearance. No distress.   HENT:   Head: Normocephalic and atraumatic.   Right Ear: Hearing normal.   Left Ear: Hearing normal.   Nose: Mucosal edema, rhinorrhea and congestion present. Right sinus exhibits maxillary sinus tenderness and frontal sinus tenderness. Left sinus exhibits maxillary sinus tenderness and frontal sinus tenderness. nasal tenderness present.  Mouth/Throat: Mouth/Lips are normal.  Pulmonary/Chest: Effort normal.  No respiratory distress. He no audible wheeze...  Neurological: He is alert and oriented to person, place, and time.   Psychiatric: He has a normal mood and affect.   Vitals reviewed.    Physical Exam      Diagnoses and all orders for this visit:    1. Acute maxillary sinusitis, recurrence not specified (Primary)  -     amoxicillin-clavulanate (AUGMENTIN) 875-125 MG per tablet; Take 1 tablet by mouth 2 (Two) Times a Day.  Dispense: 20 tablet; Refill: 0  -     predniSONE (DELTASONE) 20 MG tablet; Take 1 tablet by mouth 2 (Two) Times a Day for 5 days.  Dispense: 10 tablet; Refill: 0      Assessment & Plan  1. Sinusitis.  The symptoms suggest a viral etiology, but the patient prefers antibiotic treatment. A prescription for Augmentin has been issued, to be taken with meals due to its potential gastrointestinal side effects. Additionally, a steroid regimen of 20 mg twice daily for 5 days has been prescribed, which can be taken concurrently in the morning with food. The use of saline spray and Flonase has been  recommended for nasal relief. He is advised to continue with Sudafed.      FOLLOW-UP  As discussed during visit with [facility], if symptoms worsen or fail to improve, follow-up with PCP/Urgent Care/Emergency Department.    Patient verbalizes understanding of medications, instructions for treatment and follow-up.    Patient or patient representative verbalized consent for the use of Ambient Listening during the visit with  URGENT CARE VIDEO VISIT PROVIDER for chart documentation. 4/14/2025  16:29 EDT    VINCENT Lockhart  04/14/2025  16:29 EDT    The use of a video visit has been reviewed with the patient and verbal informed consent has been obtained. Myself and Colby Nicole participated in this visit. The patient is located in [facility], and I am located in [place], [place]. [employer] and [employer]  were utilized.

## 2025-04-15 NOTE — TELEPHONE ENCOUNTER
[facility]SA- 6/22/2018[place]- 2/25/2019Last rf- 2/25/2019No follow up is scheduled.  Medical Week 2 Survey      Flowsheet Row Responses   [facility] patient discharged from? [facility]   Does the patient have one of the following disease processes/diagnoses(primary or secondary)? Other   Week 2 attempt successful? No   Unsuccessful attempts Attempt 1            Bijal PALAFOX - Licensed Nurse